# Patient Record
Sex: FEMALE | Race: WHITE | Employment: OTHER | URBAN - NONMETROPOLITAN AREA
[De-identification: names, ages, dates, MRNs, and addresses within clinical notes are randomized per-mention and may not be internally consistent; named-entity substitution may affect disease eponyms.]

---

## 2018-09-25 ENCOUNTER — HOSPITAL ENCOUNTER (OUTPATIENT)
Age: 68
Discharge: HOME | End: 2018-09-25
Payer: MEDICARE

## 2018-09-25 ENCOUNTER — HOSPITAL ENCOUNTER (OUTPATIENT)
Dept: GENERAL RADIOLOGY | Age: 68
Discharge: HOME | End: 2018-09-25
Payer: MEDICARE

## 2018-09-25 DIAGNOSIS — M25.551 RIGHT HIP PAIN: ICD-10-CM

## 2018-09-25 DIAGNOSIS — M54.6 LOWER THORACIC BACK PAIN: ICD-10-CM

## 2018-09-25 PROCEDURE — 73502 X-RAY EXAM HIP UNI 2-3 VIEWS: CPT

## 2018-09-25 PROCEDURE — 72100 X-RAY EXAM L-S SPINE 2/3 VWS: CPT

## 2020-03-10 ENCOUNTER — OFFICE VISIT (OUTPATIENT)
Dept: INTERNAL MEDICINE CLINIC | Age: 70
End: 2020-03-10
Payer: MEDICARE

## 2020-03-10 VITALS
HEIGHT: 66 IN | SYSTOLIC BLOOD PRESSURE: 130 MMHG | DIASTOLIC BLOOD PRESSURE: 72 MMHG | WEIGHT: 115.4 LBS | BODY MASS INDEX: 18.54 KG/M2 | HEART RATE: 66 BPM

## 2020-03-10 PROCEDURE — 93000 ELECTROCARDIOGRAM COMPLETE: CPT | Performed by: INTERNAL MEDICINE

## 2020-03-10 PROCEDURE — 99204 OFFICE O/P NEW MOD 45 MIN: CPT | Performed by: INTERNAL MEDICINE

## 2020-03-10 PROCEDURE — G8510 SCR DEP NEG, NO PLAN REQD: HCPCS | Performed by: INTERNAL MEDICINE

## 2020-03-10 PROCEDURE — 3288F FALL RISK ASSESSMENT DOCD: CPT | Performed by: INTERNAL MEDICINE

## 2020-03-10 RX ORDER — LANOLIN ALCOHOL/MO/W.PET/CERES
400 CREAM (GRAM) TOPICAL DAILY
COMMUNITY
End: 2021-02-18

## 2020-03-10 RX ORDER — LISINOPRIL 5 MG/1
5 TABLET ORAL DAILY
Qty: 90 TABLET | Refills: 1 | Status: SHIPPED | OUTPATIENT
Start: 2020-03-10 | End: 2020-10-25

## 2020-03-10 RX ORDER — LORATADINE 10 MG/1
10 TABLET ORAL DAILY
COMMUNITY
End: 2020-04-27

## 2020-03-10 RX ORDER — ESTRADIOL 2 MG/1
2 RING VAGINAL ONCE
COMMUNITY

## 2020-03-10 RX ORDER — DENOSUMAB 60 MG/ML
60 INJECTION SUBCUTANEOUS ONCE
COMMUNITY

## 2020-03-10 RX ORDER — LISINOPRIL 5 MG/1
5 TABLET ORAL DAILY
COMMUNITY
End: 2020-03-10 | Stop reason: SDUPTHER

## 2020-03-10 RX ORDER — VITAMIN B COMPLEX
1 CAPSULE ORAL DAILY
COMMUNITY
End: 2022-06-23

## 2020-03-10 ASSESSMENT — PATIENT HEALTH QUESTIONNAIRE - PHQ9
SUM OF ALL RESPONSES TO PHQ QUESTIONS 1-9: 0
SUM OF ALL RESPONSES TO PHQ QUESTIONS 1-9: 0
SUM OF ALL RESPONSES TO PHQ9 QUESTIONS 1 & 2: 0
1. LITTLE INTEREST OR PLEASURE IN DOING THINGS: 0
2. FEELING DOWN, DEPRESSED OR HOPELESS: 0

## 2020-03-10 NOTE — PROGRESS NOTES
1950    Chief Complaint   Patient presents with   [facility] Doctor     establish care - due for annual physical        Pt is a 71 y.o. female who presents for initial visit to establish care. SI joint pain intermittently - recent flare calmed with Aleve. If needed send back to [facility] Dr. Yun Neil for SI joint injection. She typically has gotten on Q 18 months and done well. About due now but doing well now. Hypertension - BP controlled on lisinopril. Follow BMP. No issue with cough, follow potassium and kidney function. Allergic rhinitis - try Xyzal instead of Claritin. Then try Flonase with polysporin. She has had nose bleeds in past with nasal spray but in winter when dryness is any issue. Osteoporosis - treated by GYN - on Prolia. Past Medical History:   Diagnosis Date    Allergic rhinitis     Claritin, Flonase prn    Colon polyp     Dr. Cornelia Lema - colonoscopy done 5/2015, due in 5-7 years (hyperplastic polyps)    Hemorrhoids     s/p banding    Hypertension     Irritable bowel syndrome     Osteoporosis     on Prolia with GYN - Almarie Credit.  SI (sacroiliac) joint inflammation (HCC)     s/p SI joint injections - Dr. Flex Joy at [facility] due to insurance - 10/2018       Past Surgical History:   Procedure Laterality Date    OVARY REMOVAL  2005    TONSILLECTOMY AND ADENOIDECTOMY         Current Outpatient Medications   Medication Sig Dispense Refill    b complex vitamins capsule Take 1 capsule by mouth daily      Calcium Carb-Cholecalciferol (CALCIUM 600 + D PO) Take 1 tablet by mouth daily      loratadine (CLARITIN) 10 MG tablet Take 10 mg by mouth daily      estradiol (ESTRING) 2 MG vaginal ring Place 2 mg vaginally once Follow package directions.       folic acid (FOLVITE) 633 MCG tablet Take 400 mcg by mouth daily      VITAMIN D PO Take 1,000 Units by mouth daily      denosumab (PROLIA) 60 MG/ML SOSY SC injection Inject 60 mg into the skin once      lisinopril

## 2020-03-26 ENCOUNTER — TELEPHONE (OUTPATIENT)
Dept: INTERNAL MEDICINE CLINIC | Age: 70
End: 2020-03-26

## 2020-04-20 ENCOUNTER — VIRTUAL VISIT (OUTPATIENT)
Dept: INTERNAL MEDICINE CLINIC | Age: 70
End: 2020-04-20
Payer: MEDICARE

## 2020-04-20 VITALS
BODY MASS INDEX: 18.57 KG/M2 | WEIGHT: 115 LBS | SYSTOLIC BLOOD PRESSURE: 112 MMHG | DIASTOLIC BLOOD PRESSURE: 63 MMHG | HEART RATE: 74 BPM

## 2020-04-20 PROCEDURE — G0439 PPPS, SUBSEQ VISIT: HCPCS | Performed by: INTERNAL MEDICINE

## 2020-04-20 ASSESSMENT — LIFESTYLE VARIABLES
HAS A RELATIVE, FRIEND, DOCTOR, OR ANOTHER HEALTH PROFESSIONAL EXPRESSED CONCERN ABOUT YOUR DRINKING OR SUGGESTED YOU CUT DOWN: 0
HOW OFTEN DO YOU HAVE SIX OR MORE DRINKS ON ONE OCCASION: 0
AUDIT-C TOTAL SCORE: 4
HOW OFTEN DURING THE LAST YEAR HAVE YOU BEEN UNABLE TO REMEMBER WHAT HAPPENED THE NIGHT BEFORE BECAUSE YOU HAD BEEN DRINKING: 0
HAVE YOU OR SOMEONE ELSE BEEN INJURED AS A RESULT OF YOUR DRINKING: 0
HOW OFTEN DURING THE LAST YEAR HAVE YOU FOUND THAT YOU WERE NOT ABLE TO STOP DRINKING ONCE YOU HAD STARTED: 0
HOW OFTEN DO YOU HAVE A DRINK CONTAINING ALCOHOL: 4
HOW OFTEN DURING THE LAST YEAR HAVE YOU NEEDED AN ALCOHOLIC DRINK FIRST THING IN THE MORNING TO GET YOURSELF GOING AFTER A NIGHT OF HEAVY DRINKING: 0
HOW OFTEN DURING THE LAST YEAR HAVE YOU HAD A FEELING OF GUILT OR REMORSE AFTER DRINKING: 0
HOW MANY STANDARD DRINKS CONTAINING ALCOHOL DO YOU HAVE ON A TYPICAL DAY: 0
AUDIT TOTAL SCORE: 4
HOW OFTEN DURING THE LAST YEAR HAVE YOU FAILED TO DO WHAT WAS NORMALLY EXPECTED FROM YOU BECAUSE OF DRINKING: 0

## 2020-04-20 ASSESSMENT — PATIENT HEALTH QUESTIONNAIRE - PHQ9
SUM OF ALL RESPONSES TO PHQ QUESTIONS 1-9: 0
SUM OF ALL RESPONSES TO PHQ QUESTIONS 1-9: 0

## 2020-04-20 NOTE — PROGRESS NOTES
but not able to give right now in setting of COVID. Annual wellness visit to be set up yearly. Lucius Gruber is a 71 y.o. female being evaluated by a Virtual Visit (video visit) encounter to address concerns as mentioned above. A caregiver was present when appropriate. Due to this being a TeleHealth encounter (During FNSIS-88 public health emergency), evaluation of the following organ systems was limited: Vitals/Constitutional/EENT/Resp/CV/GI//MS/Neuro/Skin/Heme-Lymph-Imm. Pursuant to the emergency declaration under the [address] [place] [address], [address] [place] [address] authority and the [employer] and [employer] Act, this Virtual Visit was conducted with patient's (and/or legal guardian's) consent, to reduce the patient's risk of exposure to COVID-19 and provide necessary medical care. The patient (and/or legal guardian) has also been advised to contact this office for worsening conditions or problems, and seek emergency medical treatment and/or call 911 if deemed necessary. Patient identification was verified at the start of the visit: Yes    Total time spent for this encounter: Not billed by time    Services were provided through a video synchronous discussion virtually to substitute for in-person clinic visit. Patient and provider were located at their individual homes. --Riley Lackey MD on 4/27/2020 at 11:26 PM    An electronic signature was used to authenticate this note.

## 2020-04-20 NOTE — PATIENT INSTRUCTIONS
Personalized Preventive Plan for Cinthia Gallegos - 4/20/2020  Medicare offers a range of preventive health benefits. Some of the tests and screenings are paid in full while other may be subject to a deductible, co-insurance, and/or copay. Some of these benefits include a comprehensive review of your medical history including lifestyle, illnesses that may run in your family, and various assessments and screenings as appropriate. After reviewing your medical record and screening and assessments performed today your provider may have ordered immunizations, labs, imaging, and/or referrals for you. A list of these orders (if applicable) as well as your Preventive Care list are included within your After Visit Summary for your review. Other Preventive Recommendations:    · A preventive eye exam performed by an eye specialist is recommended every 1-2 years to screen for glaucoma; cataracts, macular degeneration, and other eye disorders. · A preventive dental visit is recommended every 6 months. · Try to get at least 150 minutes of exercise per week or 10,000 steps per day on a pedometer . · Order or download the FREE \"Exercise & Physical Activity: Your Everyday Guide\" from [employer]. Call 1-179.393.3862 or search [employer] online. · You need 5868-8206 mg of calcium and 5704-4818 IU of vitamin D per day. It is possible to meet your calcium requirement with diet alone, but a vitamin D supplement is usually necessary to meet this goal.  · When exposed to the sun, use a sunscreen that protects against both UVA and UVB radiation with an SPF of 30 or greater. Reapply every 2 to 3 hours or after sweating, drying off with a towel, or swimming. · Always wear a seat belt when traveling in a car. Always wear a helmet when riding a bicycle or motorcycle.

## 2020-04-21 ENCOUNTER — TELEPHONE (OUTPATIENT)
Dept: ADMINISTRATIVE | Age: 70
End: 2020-04-21

## 2020-04-27 RX ORDER — LEVOCETIRIZINE DIHYDROCHLORIDE 5 MG/1
5 TABLET, FILM COATED ORAL NIGHTLY
COMMUNITY
End: 2021-02-18

## 2020-05-22 ENCOUNTER — NURSE ONLY (OUTPATIENT)
Dept: LAB | Age: 70
End: 2020-05-22

## 2020-05-22 LAB
ANION GAP SERPL CALCULATED.3IONS-SCNC: 11 MEQ/L (ref 8–16)
BASOPHILS # BLD: 0.8 %
BASOPHILS ABSOLUTE: 0 THOU/MM3 (ref 0–0.1)
BUN BLDV-MCNC: 12 MG/DL (ref 7–22)
CALCIUM SERPL-MCNC: 9.3 MG/DL (ref 8.5–10.5)
CHLORIDE BLD-SCNC: 100 MEQ/L (ref 98–111)
CHOLESTEROL, TOTAL: 232 MG/DL (ref 100–199)
CO2: 25 MEQ/L (ref 23–33)
CREAT SERPL-MCNC: 0.8 MG/DL (ref 0.4–1.2)
EOSINOPHIL # BLD: 3 %
EOSINOPHILS ABSOLUTE: 0.1 THOU/MM3 (ref 0–0.4)
ERYTHROCYTE [DISTWIDTH] IN BLOOD BY AUTOMATED COUNT: 13.2 % (ref 11.5–14.5)
ERYTHROCYTE [DISTWIDTH] IN BLOOD BY AUTOMATED COUNT: 50.7 FL (ref 35–45)
GFR SERPL CREATININE-BSD FRML MDRD: 71 ML/MIN/1.73M2
GLUCOSE BLD-MCNC: 96 MG/DL (ref 70–108)
HCT VFR BLD CALC: 37.8 % (ref 37–47)
HDLC SERPL-MCNC: 114 MG/DL
HEMOGLOBIN: 12.2 GM/DL (ref 12–16)
HEPATITIS C ANTIBODY: NEGATIVE
IMMATURE GRANS (ABS): 0.02 THOU/MM3 (ref 0–0.07)
IMMATURE GRANULOCYTES: 0.4 %
LDL CHOLESTEROL CALCULATED: 103 MG/DL
LYMPHOCYTES # BLD: 32.3 %
LYMPHOCYTES ABSOLUTE: 1.6 THOU/MM3 (ref 1–4.8)
MCH RBC QN AUTO: 33.2 PG (ref 26–33)
MCHC RBC AUTO-ENTMCNC: 32.3 GM/DL (ref 32.2–35.5)
MCV RBC AUTO: 102.7 FL (ref 81–99)
MONOCYTES # BLD: 11.2 %
MONOCYTES ABSOLUTE: 0.5 THOU/MM3 (ref 0.4–1.3)
NUCLEATED RED BLOOD CELLS: 0 /100 WBC
PLATELET # BLD: 216 THOU/MM3 (ref 130–400)
PMV BLD AUTO: 9.6 FL (ref 9.4–12.4)
POTASSIUM SERPL-SCNC: 4.1 MEQ/L (ref 3.5–5.2)
RBC # BLD: 3.68 MILL/MM3 (ref 4.2–5.4)
SEG NEUTROPHILS: 52.3 %
SEGMENTED NEUTROPHILS ABSOLUTE COUNT: 2.6 THOU/MM3 (ref 1.8–7.7)
SODIUM BLD-SCNC: 136 MEQ/L (ref 135–145)
TRIGL SERPL-MCNC: 76 MG/DL (ref 0–199)
VITAMIN D 25-HYDROXY: 60 NG/ML (ref 30–100)
WBC # BLD: 4.9 THOU/MM3 (ref 4.8–10.8)

## 2020-06-16 PROBLEM — M81.0 SENILE OSTEOPOROSIS: Status: ACTIVE | Noted: 2020-06-16

## 2020-06-16 RX ORDER — SODIUM CHLORIDE 9 MG/ML
INJECTION, SOLUTION INTRAVENOUS CONTINUOUS
Status: CANCELLED | OUTPATIENT
Start: 2020-06-19

## 2020-06-16 RX ORDER — METHYLPREDNISOLONE SODIUM SUCCINATE 125 MG/2ML
125 INJECTION, POWDER, LYOPHILIZED, FOR SOLUTION INTRAMUSCULAR; INTRAVENOUS ONCE
Status: CANCELLED | OUTPATIENT
Start: 2020-06-19

## 2020-06-16 RX ORDER — EPINEPHRINE 1 MG/ML
0.3 INJECTION, SOLUTION, CONCENTRATE INTRAVENOUS PRN
Status: CANCELLED | OUTPATIENT
Start: 2020-06-19

## 2020-06-16 RX ORDER — DIPHENHYDRAMINE HYDROCHLORIDE 50 MG/ML
50 INJECTION INTRAMUSCULAR; INTRAVENOUS ONCE
Status: CANCELLED | OUTPATIENT
Start: 2020-06-19

## 2020-07-07 ENCOUNTER — HOSPITAL ENCOUNTER (OUTPATIENT)
Dept: NURSING | Age: 70
Discharge: HOME | End: 2020-07-07
Payer: MEDICARE

## 2020-07-07 VITALS
HEART RATE: 70 BPM | SYSTOLIC BLOOD PRESSURE: 128 MMHG | OXYGEN SATURATION: 99 % | RESPIRATION RATE: 18 BRPM | DIASTOLIC BLOOD PRESSURE: 66 MMHG

## 2020-07-07 DIAGNOSIS — M81.0 SENILE OSTEOPOROSIS: Primary | ICD-10-CM

## 2020-07-07 PROCEDURE — 6360000002 HC RX W HCPCS: Performed by: NURSE PRACTITIONER

## 2020-07-07 PROCEDURE — 96372 THER/PROPH/DIAG INJ SC/IM: CPT

## 2020-07-07 RX ORDER — SODIUM CHLORIDE 9 MG/ML
INJECTION, SOLUTION INTRAVENOUS CONTINUOUS
Status: CANCELLED | OUTPATIENT
Start: 2021-01-05

## 2020-07-07 RX ORDER — METHYLPREDNISOLONE SODIUM SUCCINATE 125 MG/2ML
125 INJECTION, POWDER, LYOPHILIZED, FOR SOLUTION INTRAMUSCULAR; INTRAVENOUS ONCE
Status: CANCELLED | OUTPATIENT
Start: 2021-01-05

## 2020-07-07 RX ORDER — DIPHENHYDRAMINE HYDROCHLORIDE 50 MG/ML
50 INJECTION INTRAMUSCULAR; INTRAVENOUS ONCE
Status: CANCELLED | OUTPATIENT
Start: 2021-01-05

## 2020-07-07 RX ADMIN — DENOSUMAB 60 MG: 60 INJECTION SUBCUTANEOUS at 11:05

## 2020-07-07 NOTE — PROGRESS NOTES
1100 pt arrives ambulatory for prolia injection. Injection explained and questions answered. PT RIGHTS AND RESPONSIBILITIES OFFERED TO PT.  1105 injection given. Pt tolerated it well with no complaints. 1110 pt discharged ambulatory with instructions with no complaints.            _m___ Safety:       (Environmental)   [place] to environment   Ensure ID band is correct and in place/ allergy band as needed   Assess for fall risk   Initiate fall precautions as applicable (fall band, side rails, etc.)   Call light within reach   Bed in low position/ wheels locked    m____ Pain:        Assess pain level and characteristics   Administer analgesics as ordered   Assess effectiveness of pain management and report to MD as needed    _m___ Knowledge Deficit:   Assess baseline knowledge   Provide teaching at level of understanding   Provide teaching via preferred learning method   Evaluate teaching effectiveness    __m__ Hemodynamic/Respiratory Status:       (Pre and Post Procedure Monitoring)   Assess/Monitor vital signs and LOC   Assess Baseline SpO2 prior to any sedation   Obtain weight/height   Assess vital signs/ LOC until patient meets discharge criteria   Monitor procedure site and notify MD of any issues

## 2021-01-08 ENCOUNTER — HOSPITAL ENCOUNTER (OUTPATIENT)
Dept: NURSING | Age: 71
Discharge: HOME | End: 2021-01-08
Payer: MEDICARE

## 2021-01-08 VITALS
HEIGHT: 66 IN | DIASTOLIC BLOOD PRESSURE: 79 MMHG | BODY MASS INDEX: 18.26 KG/M2 | TEMPERATURE: 98.1 F | RESPIRATION RATE: 18 BRPM | HEART RATE: 72 BPM | OXYGEN SATURATION: 99 % | SYSTOLIC BLOOD PRESSURE: 146 MMHG | WEIGHT: 113.6 LBS

## 2021-01-08 DIAGNOSIS — M81.0 SENILE OSTEOPOROSIS: Primary | ICD-10-CM

## 2021-01-08 PROCEDURE — 96372 THER/PROPH/DIAG INJ SC/IM: CPT

## 2021-01-08 PROCEDURE — 6360000002 HC RX W HCPCS: Performed by: NURSE PRACTITIONER

## 2021-01-08 RX ORDER — SODIUM CHLORIDE 9 MG/ML
INJECTION, SOLUTION INTRAVENOUS CONTINUOUS
Status: CANCELLED | OUTPATIENT
Start: 2021-07-09

## 2021-01-08 RX ORDER — DIPHENHYDRAMINE HYDROCHLORIDE 50 MG/ML
50 INJECTION INTRAMUSCULAR; INTRAVENOUS ONCE
Status: CANCELLED | OUTPATIENT
Start: 2021-07-09 | End: 2021-07-09

## 2021-01-08 RX ORDER — METHYLPREDNISOLONE SODIUM SUCCINATE 125 MG/2ML
125 INJECTION, POWDER, LYOPHILIZED, FOR SOLUTION INTRAMUSCULAR; INTRAVENOUS ONCE
Status: CANCELLED | OUTPATIENT
Start: 2021-07-09 | End: 2021-07-09

## 2021-01-08 RX ADMIN — DENOSUMAB 60 MG: 60 INJECTION SUBCUTANEOUS at 11:29

## 2021-01-08 ASSESSMENT — PAIN - FUNCTIONAL ASSESSMENT: PAIN_FUNCTIONAL_ASSESSMENT: 0-10

## 2021-01-08 NOTE — PROGRESS NOTES
__m__ Safety:       (Environmental)  ? [place] to environment  ? Ensure ID band is correct and in place/ allergy band as needed  ? Assess for fall risk  ? Initiate fall precautions as applicable (fall band, side rails, etc.)  ? Call light within reach  ? Bed in low position/ wheels locked    _m___ Pain:       ? Assess pain level and characteristics  ? Administer analgesics as ordered  ? Assess effectiveness of pain management and report to MD as needed    _m___ Knowledge Deficit:  ? Assess baseline knowledge  ? Provide teaching at level of understanding  ? Provide teaching via preferred learning method  ? Evaluate teaching effectiveness    __m__ Hemodynamic/Respiratory Status:       (Pre and Post Procedure Monitoring)  ? Assess/Monitor vital signs and LOC  ? Assess Baseline SpO2 prior to any sedation  ? Obtain weight/height  ? Assess vital signs/ LOC until patient meets discharge criteria  ?  Monitor procedure site and notify MD of any issues

## 2021-02-18 ENCOUNTER — OFFICE VISIT (OUTPATIENT)
Dept: INTERNAL MEDICINE CLINIC | Age: 71
End: 2021-02-18
Payer: MEDICARE

## 2021-02-18 VITALS
WEIGHT: 111.8 LBS | DIASTOLIC BLOOD PRESSURE: 68 MMHG | HEART RATE: 72 BPM | BODY MASS INDEX: 17.97 KG/M2 | TEMPERATURE: 97.3 F | HEIGHT: 66 IN | SYSTOLIC BLOOD PRESSURE: 124 MMHG

## 2021-02-18 DIAGNOSIS — I10 ESSENTIAL HYPERTENSION: ICD-10-CM

## 2021-02-18 DIAGNOSIS — M54.41 ACUTE RIGHT-SIDED LOW BACK PAIN WITH RIGHT-SIDED SCIATICA: Primary | ICD-10-CM

## 2021-02-18 DIAGNOSIS — J30.9 ALLERGIC RHINITIS, UNSPECIFIED SEASONALITY, UNSPECIFIED TRIGGER: ICD-10-CM

## 2021-02-18 PROCEDURE — 99214 OFFICE O/P EST MOD 30 MIN: CPT | Performed by: INTERNAL MEDICINE

## 2021-02-18 RX ORDER — ACYCLOVIR 50 MG/G
OINTMENT TOPICAL
COMMUNITY
End: 2021-10-28

## 2021-02-18 RX ORDER — FEXOFENADINE HCL 180 MG/1
180 TABLET ORAL DAILY
COMMUNITY

## 2021-02-18 RX ORDER — DIAPER,BRIEF,INFANT-TODD,DISP
EACH MISCELLANEOUS
Qty: 1 TUBE | Refills: 1 | Status: CANCELLED | OUTPATIENT
Start: 2021-02-18 | End: 2021-02-25

## 2021-02-18 ASSESSMENT — PATIENT HEALTH QUESTIONNAIRE - PHQ9
2. FEELING DOWN, DEPRESSED OR HOPELESS: 0
SUM OF ALL RESPONSES TO PHQ QUESTIONS 1-9: 0
SUM OF ALL RESPONSES TO PHQ9 QUESTIONS 1 & 2: 0
SUM OF ALL RESPONSES TO PHQ QUESTIONS 1-9: 0
1. LITTLE INTEREST OR PLEASURE IN DOING THINGS: 0

## 2021-02-18 NOTE — PROGRESS NOTES
1950    Chief Complaint   Patient presents with    Back Pain     sciatic down right leg    Hypertension    Allergic Rhinitis        Pt is a 79 y.o. female who presents for acute visit for back and right leg pain, and follow up of HTN and allergic rhinitis. SI joint pain intermittently - she had x-ray right hip 2016 with mild degenerative changes of right SI joint and given SI join steroid injection at [facility] radiology. She had issues again 9/2018 and right hip x-ray with mild joint space narrowing and sclerosis at right SI joint/lumbar spine x-ray noted mild facet disease L3-S1 and mild levoconvex curvature of lumbar spine. Again was sent back to [facility], Dr. Charline Holden, for SI joint injection. This January 2021, she started to have right hip - now pain down leg and foot tingles - but intermittent. It is better if standing, worse with sitting and lying down. If takes enough Aleve it will go away but comes back again. She does do yoga. Will set up PT and Aleve x 6 weeks - see back in 8 weeks and will see if need to progress to MRI. Sleeps on left side only as unable to sleep on right side due to pain. Hypertension - BP controlled on lisinopril. Follow BMP - 6/2020 normal.  No issue with cough, follow potassium and kidney function. Allergic rhinitis - at previous visit suggested she try Xyzal instead of Claritin. Then try Flonase with polysporin. She has had nose bleeds in past with nasal spray but in winter when dryness is any issue. Xyzal affected sleep. Suggested she stay on Allegra as tolerating this well. Osteoporosis - treated by GYN - Daya Rojo CNP - on Prolia. She wants to wait till June to get labs for Prolia and HTN.       Past Medical History:   Diagnosis Date    Allergic rhinitis     Claritin, Flonase prn    Colon polyp     Dr. Manuel Rosenberg - colonoscopy done 5/2015, due in 5-7 years (hyperplastic polyps)    Hemorrhoids     s/p banding    Hypertension     Irritable bowel syndrome  Osteoporosis     on Prolia with GYN - Negar Otero.  SI (sacroiliac) joint inflammation (HCC)     s/p SI joint injections - Dr. Samuel Haile at [facility] due to insurance - 10/2018       Current Outpatient Medications   Medication Sig Dispense Refill    fexofenadine (ALLEGRA ALLERGY) 180 MG tablet Take 180 mg by mouth daily      acyclovir (ZOVIRAX) 5 % ointment Apply topically every 3 hours Apply topically every 3 hours.  lisinopril (PRINIVIL;ZESTRIL) 5 MG tablet TAKE 1 TABLET DAILY 90 tablet 1    Triamcinolone Acetonide (NASACORT ALLERGY 24HR NA) by Nasal route      NONFORMULARY Compounded hormones 6x a week.  b complex vitamins capsule Take 1 capsule by mouth daily      Calcium Carb-Cholecalciferol (CALCIUM 600 + D PO) Take 1 tablet by mouth daily      estradiol (ESTRING) 2 MG vaginal ring Place 2 mg vaginally once Follow package directions.  VITAMIN D PO Take 1,000 Units by mouth daily      denosumab (PROLIA) 60 MG/ML SOSY SC injection Inject 60 mg into the skin once      miconazole nitrate 2 % OINT Apply topically 2 times daily 1 Tube 1     No current facility-administered medications for this visit. Allergies   Allergen Reactions    Pcn [Penicillins] Other (See Comments)    Chocolate Nausea And Vomiting    Peppermint Oil Nausea And Vomiting     Review of Systems - General ROS: negative for - chills or fever  Psychological ROS: negative for - anxiety and depression  Hematological and Lymphatic ROS: No history of blood clots or bleeding disorder.    Respiratory ROS: no cough, shortness of breath, or wheezing  Cardiovascular ROS: no chest pain or dyspnea on exertion, tolerates a flight of stairs, vacuuming  Gastrointestinal ROS: no abdominal pain, change in bowel habits, or black or bloody stools  Genito-Urinary ROS: no dysuria, trouble voiding, or hematuria  Musculoskeletal ROS: negative for - muscle pain or muscular weakness, positive right buttock/leg pain  Neurological ROS: negative for - headaches, numbness/tingling, seizures or weakness  Dermatological ROS: negative for - rash or skin lesion changes - rash with second COVID vaccine. Blood pressure 124/68, pulse 72, temperature 97.3 °F (36.3 °C), height 5' 5.98\" (1.676 m), weight 111 lb 12.8 oz (50.7 kg). Physical Examination: General appearance -alert, well appearing, and in no distress  Mental status - alert, oriented to person, place, and time  Head - atraumatic, normocephalic  Neck - supple, no significant adenopathy  Chest - clear to auscultation, no wheezes, rales or rhonchi, symmetric air entry  Heart - normal rate, regular rhythm, normal S1, S2, no murmurs, rubs, clicks or gallops  Abdomen -soft, nontender, nondistended  Neurological - alert, oriented, motor and sensation are grossly intact bilateral lower extremities. Back - no pain with palpation of spine, slight right paraspinal muscles tenderness. Pain deep in right buttock into right leg. Extremities - peripheral pulses normal, no pedal edema, no clubbing or cyanosis  Skin - warm and dry    Diagnostic data:   I have reviewed recent diagnostic testing including labs 6/2020, previous hip and lumbar spine x-rays with patient. Assessment and Plan:       Diagnosis Orders   1. Acute right-sided low back pain with right-sided sciatica  [facility] -  [facility]   2. Essential hypertension     3. Allergic rhinitis, unspecified seasonality, unspecified trigger       Right sided back pain with right buttock/leg pain - as it improves with Aleve - will try a longer course with PT to strengthen core and stretch out leg muscles. Pain could be related to facet disease and not SI joint as having intermittent numbness. May consider MRI lumbar spine if continues with pain after Aleve x 6 weeks and PT. Hypertension - BP controlled, continue lisinopril - order BMP for 6/2021. Allergic rhinitis - stable, continue Allegra.     Allergies: Pcn [penicillins], Chocolate, and Peppermint oil     Follow up in 2 months for wellness visit but could switch to regular visit is still having back pain. Shavon Roberson MD    [address] [place] [address] INTERNAL MEDICINE  [address]  Dept: [phone]  Dept Fax: [phone]: [phone]

## 2021-02-24 ENCOUNTER — TELEPHONE (OUTPATIENT)
Dept: INTERNAL MEDICINE CLINIC | Age: 71
End: 2021-02-24

## 2021-02-24 NOTE — TELEPHONE ENCOUNTER
Patient called stating that she thought that you were going to send a ointment to the pharmacy for her . Patient thought it was an antifungal . Please advise . I did not see anything in the note .  ( [facility] )

## 2021-02-25 ENCOUNTER — HOSPITAL ENCOUNTER (OUTPATIENT)
Dept: PHYSICAL THERAPY | Age: 71
Setting detail: THERAPIES SERIES
Discharge: HOME | End: 2021-02-25
Payer: MEDICARE

## 2021-02-25 DIAGNOSIS — K13.0 ANGULAR CHEILITIS: Primary | ICD-10-CM

## 2021-02-25 PROCEDURE — 97161 PT EVAL LOW COMPLEX 20 MIN: CPT

## 2021-02-25 PROCEDURE — 97140 MANUAL THERAPY 1/> REGIONS: CPT

## 2021-02-25 NOTE — PROGRESS NOTES
** PLEASE SIGN, DATE AND TIME CERTIFICATION BELOW AND RETURN TO [facility] OUTPATIENT [facility]HABILITATION (FAX #: [phone]). ATTEST/CO-SIGN IF ACCESSING VIA [employer]. THANK YOU.**    I certify that I have examined the patient below and determined that Physical Medicine and Rehabilitation service is necessary and that I approve the established plan of care for up to 90 days or as specifically noted. Attestation, signature or co-signature of physician indicates approval of certification requirements.    ________________________ ____________ __________  Physician Signature   Date   Time  [address] [facility]  PHYSICAL THERAPY  [x] EVALUATION  [] DAILY NOTE (LAND) [] DAILY NOTE (AQUATIC ) [] PROGRESS NOTE [] DISCHARGE NOTE    [x] [address] [facility]   [] [place] [address]    [] [address] [facility]   [] [facility]    Date: 2021  Patient Name:  Vic Thibodeaux  : 1950  MRN: [id number]  CSN: [id number]    Referring Practitioner Deja Gracia MD   Diagnosis Lumbago with sciatica, right side [M54.41]    Treatment Diagnosis Acute on chronic right lumbar pain with sciatica, core and hip weakness, leg length discrepancy   Date of Evaluation 21    Additional Pertinent History HTN, osteoporosis. Functional Outcome Measure Used Oswestry   Functional Outcome Score 9/50= 18% (21)       Insurance: Primary: Payor: Génesis Rodriguez /  /  / ,   Secondary:    Authorization Information: Aquatics and modalities covered except ionto, NO telehealth   Visit # 1, 1/10 for progress note   Visits Allowed: Unlimited    Recertification Date:    Physician Follow-Up: 21   Physician Orders:    History of Present Illness: 4 years ago, pt had XR which showed degeneration of SIJ, so had injections which helped. Pain returned a year ago, took Aleve and went away in 10 days. Now in January to developed pain in right hip with tingling in foot.  Pt does yoga which helps, denies doing any prior PT for this issue. SUBJECTIVE: Pain with sitting and laying down. Right leg has sensation of giving out when walking, with soreness when rubbing anterior thigh. Dr. Meryle Po wants pt to try PT, if unsuccessful will order an MRI. Pt unable to lay on right side and difficulty sleeping. Tingling in right legs comes and goes. Pt notes if she takes enough Aleve she can manage pain. Pt denies use of heat or ice. Social/Functional History and Current Status:  Medications and Allergies have been reviewed and are listed on Medical History Questionnaire. Steve Covarrubias lives alone in a single story home with 2 stairs and no handrail to enter. Task Previous Current   ADLs  Independent Independent   IADL's Independent Independent   Ambulation Independent Independent   Transfers Independent Independent   Recreation Independent- yoga 2-3x/wk Independent   Community Integration Independent Independent   Driving Active  Active    Work Retired  Retired     OBJECTIVE:    Pain: 4/10 right hip/thigh soreness/burning, 7/10 at worst   Palpation Tender right piriformis   Observation Sits with right leg crossed over left, slightly rotated to left; thin and reports being flexible; right leg ~1/4 inch shorter than left   Posture Normal spinal curvature, erect posture, level hips and PSIS       Range of Motion Lumbar:  WNL- no increased pain  Hip WFL- no increased pain   Strength Hip 4+/5 to 5-/5  Knee 4+/5  Ankle df 4/5   Coordination    Sensation Intermittent tingling in right leg down to foot   Bed Mobility Independent    Transfers Independent    Ambulation No AD, equal step length, mild left trunk lean during left stance   Stairs    Balance    Special Tests Negative SLR, HAWK and FADIR   Repetitive motion testing Loaded flexion and extension 10x- no reproduction or increased pain  Unloaded flexion and extension 10x- no reproduction or increased pain     TREATMENT Precautions:    Pain: 4/10 right hip/thigh    X in shaded column indicates activity completed today   Modalities Parameters/  Location  Notes                     Manual Therapy Time/Technique  Notes   Long leg traction right 2x30 sec x    Piriformis release right x5 min x Less thigh pain         Exercise/Intervention   Notes                                                                                  Specific Interventions Next Treatment: continue trigger point release to piriformis, piriformis and hamstring stretching, core stabilization    Activity/Treatment Tolerance:  [x]  Patient tolerated treatment well  []  Patient limited by fatigue  []  Patient limited by pain   []  Patient limited by medical complications  []  Other:     Assessment: 79year old presents with acute on chronic lumbar pain radiating into right hip and thigh and occasionally down the leg. Pt demos leg length discrepancy, so heel lift inserted into right shoe. Pt demos piriformis tightness and core/hip weakness. Pt has good flexibility and ok to continue yoga. Pt will benefit from skilled PT to address listed impairments. Body Structures/Functions/Activity Limitations: impaired ROM, impaired strength, pain and abnormal gait  Prognosis: good    GOALS:  Patient Goal: Decrease right leg pain    Short Term Goals:  Time Frame: 6 weeks  Patient will report reduced pain to 3/10 to tolerate sitting and laying down to sleep. Patient will demonstrate good core engagement during therapy exercises without cues  to carry over to functional activities, lifting, and housework. Patient will improve B hip strength to 5/5 for stabilization when walking, lifting, and cleaning. Patient will deny right piriformis tenderness to decrease radicular pain for sitting and sleeping. Long Term Goals:  Time Frame: 12 weeks  Patient will be independent and compliant with HEP daily to achieve above goals.    Pt will reduce Oswestry score from 18% to 10% to tolerate sitting and sleeping. Patient Education:   [x]  HEP/Education Completed: Plan of Care, Goals, attendance policy, piriformis massage with tennis ball or backnobber, heel lift wear schedule   Medbridge Access Code:  []  No new Education completed  []  Reviewed Prior HEP      [x]  Patient verbalized and/or demonstrated understanding of education provided. []  Patient unable to verbalize and/or demonstrate understanding of education provided. Will continue education. []  Barriers to learning:     PLAN:  Treatment Recommendations: Strengthening, Range of Motion, Manual Therapy - Soft Tissue Mobilization, Home Exercise Program, Patient Education, Aquatics and Modalities    [x]  Plan of care initiated. Plan to see patient 2 times per week for 12 weeks to address the treatment planned outlined above.   []  Continue with current plan of care  []  Modify plan of care as follows:    []  Hold pending physician visit  []  Discharge    Time In 1440   Time Out 1520   Timed Code Minutes: 10 min   Total Treatment Time: 40 min       Electronically Signed by: Bela Barajas

## 2021-03-03 ENCOUNTER — HOSPITAL ENCOUNTER (OUTPATIENT)
Dept: PHYSICAL THERAPY | Age: 71
Setting detail: THERAPIES SERIES
Discharge: HOME | End: 2021-03-03
Payer: MEDICARE

## 2021-03-03 PROCEDURE — 97110 THERAPEUTIC EXERCISES: CPT

## 2021-03-05 ENCOUNTER — HOSPITAL ENCOUNTER (OUTPATIENT)
Dept: PHYSICAL THERAPY | Age: 71
Setting detail: THERAPIES SERIES
Discharge: HOME | End: 2021-03-05
Payer: MEDICARE

## 2021-03-05 PROCEDURE — 97110 THERAPEUTIC EXERCISES: CPT

## 2021-03-05 PROCEDURE — 97140 MANUAL THERAPY 1/> REGIONS: CPT

## 2021-03-05 NOTE — PROGRESS NOTES
Exercise/Intervention   Notes   Nustep warm up no arms  4 minutes   X Level 4   Supine hamstring stretch with strap 2x30\"  X    Supine piriformis figure 4 stretch 2x30\"  X    Supine piriformis stretch pushing into ER 2 x 30\"      Ab brace  10x3\" 10 minutes X continued instruction on self hand placements, isolation of TrA and breathing patterns     Ab brace with ball squeeze and ab brace with hip abduction 10x3\" orange X    Ab brace with alternating UE, march, alternating UE and LE, SLR, unilateral knee fall out, heel slide 10 x each  X    Ab iso with bridge, bridge with knee extension 10x each  X                           Specific Interventions Next Treatment: continue trigger point release to piriformis, piriformis and hamstring stretching, core stabilization    Activity/Treatment Tolerance:  [x]  Patient tolerated treatment well  []  Patient limited by fatigue  []  Patient limited by pain   []  Patient limited by medical complications  []  Other:     Assessment: Added core strength exercises this date. Patient denies any increased pain with exercises. Patient does report some soreness with manual piriformis relief but does have decreased thigh pain during. Patient would benefit from continued skilled PT to further improve core strength and decrease pain to allow improved functional mobility. GOALS:  Patient Goal: Decrease right leg pain    Short Term Goals:  Time Frame: 6 weeks  Patient will report reduced pain to 3/10 to tolerate sitting and laying down to sleep. Patient will demonstrate good core engagement during therapy exercises without cues  to carry over to functional activities, lifting, and housework. Patient will improve B hip strength to 5/5 for stabilization when walking, lifting, and cleaning. Patient will deny right piriformis tenderness to decrease radicular pain for sitting and sleeping.      Long Term Goals:  Time Frame: 12 weeks  Patient will be independent and compliant with HEP daily

## 2021-03-10 ENCOUNTER — HOSPITAL ENCOUNTER (OUTPATIENT)
Dept: PHYSICAL THERAPY | Age: 71
Setting detail: THERAPIES SERIES
Discharge: HOME | End: 2021-03-10
Payer: MEDICARE

## 2021-03-10 PROCEDURE — 97110 THERAPEUTIC EXERCISES: CPT

## 2021-03-10 NOTE — PROGRESS NOTES
[address] [facility]  PHYSICAL THERAPY  [] EVALUATION  [x] DAILY NOTE (LAND) [] DAILY NOTE (AQUATIC ) [] PROGRESS NOTE [] DISCHARGE NOTE    [x] OUTPATIENT REHABILITATION CENTER [facility]   [] [place] [address]    [] [facility]   [] Gregorio Feldman    Date: 3/10/2021  Patient Name:  Analia Desir  : 1950  MRN: [id number]  CSN: [id number]    Referring Practitioner Norma Ortega MD   Diagnosis Lumbago with sciatica, right side [M54.41]    Treatment Diagnosis Acute on chronic right lumbar pain with sciatica, core and hip weakness, leg length discrepancy   Date of Evaluation 21    Additional Pertinent History HTN, osteoporosis. Functional Outcome Measure Used Oswestry   Functional Outcome Score = 18% (21)       Insurance: Primary: Payor: Tucker Tolbert /  /  / ,   Secondary:    Authorization Information: Aquatics and modalities covered except ionto, NO telehealth   Visit # 4, 4/10 for progress note   Visits Allowed: Unlimited    Recertification Date: 52   Physician Follow-Up: 21   Physician Orders:    History of Present Illness: 4 years ago, pt had XR which showed degeneration of SIJ, so had injections which helped. Pain returned a year ago, took Aleve and went away in 10 days. Now in January to developed pain in right hip with tingling in foot. Pt does yoga which helps, denies doing any prior PT for this issue. SUBJECTIVE: Patient stood for about 4 hours in standing yesterday and this morning had increase in low back pain but describes as a soreness. Patient notes the glut is sore and overall feels she is making slow but steady progress.      TREATMENT   Precautions:    Pain: 3/10 right hip/thigh    X in shaded column indicates activity completed today   Modalities Parameters/  Location  Notes                     Manual Therapy Time/Technique  Notes   Long leg traction right 2x30 sec  Perform again next visit, didn't have time to perform today   Piriformis release right x5 min x Less thigh pain         Exercise/Intervention   Notes   Nustep warm up no arms  3 minutes   X Level 4   Supine hamstring stretch with strap 2x30\"      Supine piriformis figure 4 stretch 2x30\"  X    Supine piriformis stretch pushing into ER 2 x 30\"  X    Ab brace  10x3\" 10 minutes  continued instruction on self hand placements, isolation of TrA and breathing patterns     Ab brace with ball squeeze and ab brace with hip abduction 10x3\" orange  D/C to home program   Ab brace alternating UE and LE extension, SLR, unilateral knee fall out, heel slide 10 x each  X Only performed dead bug and SLR    Ab iso with bridge hands on stomch, bridge with knee extension 10x each 3 seconds X           L sidelying clam and sidelying (strengthen R glut) 10x each 3 seconds X                         hydrostick all directions with ab brace  x10 ew  X             Specific Interventions Next Treatment: continue trigger point release to piriformis, piriformis and hamstring stretching, core stabilization    Activity/Treatment Tolerance:  [x]  Patient tolerated treatment well  []  Patient limited by fatigue  []  Patient limited by pain   []  Patient limited by medical complications  []  Other:     Assessment: Palpable mm spasm along R piriformis noted with patient noting some soreness afterwards. Patient is able to tolerate today's progressions fairly well but does benefit from cues for neutral spine positioning to avoid lumbar extension. GOALS:  Patient Goal: Decrease right leg pain    Short Term Goals:  Time Frame: 6 weeks  Patient will report reduced pain to 3/10 to tolerate sitting and laying down to sleep. Patient will demonstrate good core engagement during therapy exercises without cues  to carry over to functional activities, lifting, and housework. Patient will improve B hip strength to 5/5 for stabilization when walking, lifting, and cleaning.    Patient will deny right

## 2021-03-12 ENCOUNTER — HOSPITAL ENCOUNTER (OUTPATIENT)
Dept: PHYSICAL THERAPY | Age: 71
Setting detail: THERAPIES SERIES
Discharge: HOME | End: 2021-03-12
Payer: MEDICARE

## 2021-03-12 PROCEDURE — 97110 THERAPEUTIC EXERCISES: CPT

## 2021-03-12 NOTE — PROGRESS NOTES
[address] [facility]  PHYSICAL THERAPY  [] EVALUATION  [x] DAILY NOTE (LAND) [] DAILY NOTE (AQUATIC ) [] PROGRESS NOTE [] DISCHARGE NOTE    [x] OUTPATIENT REHABILITATION CENTER [facility]   [] [place] [address]    [] [facility]   [] Janna Fredericknidla    Date: 3/12/2021  Patient Name:  Danay Pepe  : 1950  MRN: [id number]  CSN: [id number]    Referring Practitioner Toy Quiroz MD   Diagnosis Lumbago with sciatica, right side [M54.41]    Treatment Diagnosis Acute on chronic right lumbar pain with sciatica, core and hip weakness, leg length discrepancy   Date of Evaluation 21    Additional Pertinent History HTN, osteoporosis. Functional Outcome Measure Used Oswestry   Functional Outcome Score = 18% (21)       Insurance: Primary: Payor: Miguel Clemons /  /  / ,   Secondary:    Authorization Information: Aquatics and modalities covered except ionto, NO telehealth   Visit # 5, 5/10 for progress note   Visits Allowed: Unlimited    Recertification Date:    Physician Follow-Up: 21   Physician Orders:    History of Present Illness: 4 years ago, pt had XR which showed degeneration of SIJ, so had injections which helped. Pain returned a year ago, took Aleve and went away in 10 days. Now in January to developed pain in right hip with tingling in foot. Pt does yoga which helps, denies doing any prior PT for this issue. SUBJECTIVE: Patient c/o right hip soreness with thigh tenderness when driving. Pt reports yoga doesn't aggravate symptoms. Pain seems to come and go.      TREATMENT   Precautions:    Pain: 3/10 right hip/thigh    X in shaded column indicates activity completed today   Modalities Parameters/  Location  Notes                     Manual Therapy Time/Technique  Notes   Long leg traction right 2x30 sec  Perform again next visit, didn't have time to perform today   Piriformis release right x5 min x Less thigh pain Exercise/Intervention   Notes   Nustep warm up no arms  5 minutes   X Level 4   Supine hamstring stretch with strap 2x30\"      Supine piriformis figure 4 stretch 2x30\"  X B   Supine piriformis stretch pushing into ER 2 x 30\"  X B   Ab brace  10x3\" 10 minutes  continued instruction on self hand placements, isolation of TrA and breathing patterns     Ab brace with ball squeeze and ab brace with hip abduction 10x3\" orange  D/C to home program   Ab brace alternating UE and LE extension, SLR, unilateral knee fall out, heel slide 15 x each  X    Ab iso with bridge hands on stomch, bridge with knee extension 10x each 3 seconds X           Sidelying hip abduction and clam, B 10x each 3 seconds X    Quadruped ab bracing, with alt UEs and alt LEs  10 5 sec x    Seated on yellow stability ball: ab bracing, alt march, alt LAQ 10 5 sec x           hydrostick all directions with ab brace  x10 ew  X             Specific Interventions Next Treatment: continue trigger point release to piriformis, piriformis and hamstring stretching, core stabilization    Activity/Treatment Tolerance:  [x]  Patient tolerated treatment well  []  Patient limited by fatigue  []  Patient limited by pain   []  Patient limited by medical complications  []  Other:     Assessment: Introduced quadruped and seated stability ball exercises with challenge noted but no increased pain. Pt required cues for core engagement during new exercises. Pt notes increased soreness in right hip/thigh during and at end of session. GOALS:  Patient Goal: Decrease right leg pain    Short Term Goals:  Time Frame: 6 weeks  Patient will report reduced pain to 3/10 to tolerate sitting and laying down to sleep. Patient will demonstrate good core engagement during therapy exercises without cues  to carry over to functional activities, lifting, and housework. Patient will improve B hip strength to 5/5 for stabilization when walking, lifting, and cleaning.    Patient will deny right piriformis tenderness to decrease radicular pain for sitting and sleeping. Long Term Goals:  Time Frame: 12 weeks  Patient will be independent and compliant with HEP daily to achieve above goals. Pt will reduce Oswestry score from 18% to 10% to tolerate sitting and sleeping. Patient Education:   []  HEP/Education Completed: clamshell and sidelying hip abduction   Medbridge Access Code:  [x]  No new Education completed  []  Reviewed Prior HEP      []  Patient verbalized and/or demonstrated understanding of education provided. []  Patient unable to verbalize and/or demonstrate understanding of education provided. Will continue education. []  Barriers to learning:     PLAN:  Treatment Recommendations: Strengthening, Range of Motion, Manual Therapy - Soft Tissue Mobilization, Home Exercise Program, Patient Education, Aquatics and Modalities    []  Plan of care initiated. Plan to see patient 2 times per week for 12 weeks to address the treatment planned outlined above.   [x]  Continue with current plan of care  []  Modify plan of care as follows:    []  Hold pending physician visit  []  Discharge    Time In 1300   Time Out 1340   Timed Code Minutes: 40 min   Total Treatment Time: 40 min       Electronically Signed by: Dylon Gutierrez

## 2021-03-17 ENCOUNTER — HOSPITAL ENCOUNTER (OUTPATIENT)
Dept: PHYSICAL THERAPY | Age: 71
Setting detail: THERAPIES SERIES
Discharge: HOME | End: 2021-03-17
Payer: MEDICARE

## 2021-03-17 PROCEDURE — 97110 THERAPEUTIC EXERCISES: CPT

## 2021-03-17 NOTE — PROGRESS NOTES
[address] [facility]  PHYSICAL THERAPY  [] EVALUATION  [x] DAILY NOTE (LAND) [] DAILY NOTE (AQUATIC ) [] PROGRESS NOTE [] DISCHARGE NOTE    [x] OUTPATIENT REHABILITATION CENTER [facility]   [] [place] [address]    [] [facility]   [] Gil Picking    Date: 3/17/2021  Patient Name:  Jacinto Prather  : 1950  MRN: [id number]  CSN: [id number]    Referring Practitioner Ml Owen MD   Diagnosis Lumbago with sciatica, right side [M54.41]    Treatment Diagnosis Acute on chronic right lumbar pain with sciatica, core and hip weakness, leg length discrepancy   Date of Evaluation 21    Additional Pertinent History HTN, osteoporosis. Functional Outcome Measure Used Oswestry   Functional Outcome Score = 18% (21)       Insurance: Primary: Payor: Minda Hall /  /  / ,   Secondary:    Authorization Information: Aquatics and modalities covered except ionto, NO telehealth   Visit # 6, 6/10 for progress note   Visits Allowed: Unlimited    Recertification Date:    Physician Follow-Up: 21   Physician Orders:    History of Present Illness: 4 years ago, pt had XR which showed degeneration of SIJ, so had injections which helped. Pain returned a year ago, took Aleve and went away in 10 days. Now in January to developed pain in right hip with tingling in foot. Pt does yoga which helps, denies doing any prior PT for this issue. SUBJECTIVE: Patient notes her symptoms are no longer extending into her toes and seem to be centralizing to her thigh and low back. Patient does feel the intensity can increase even more so especially with prolonged standing. Patient also notes difficulty turning in her bed. Patient unable to sleep on R side due to pain. Patient does feel her recovery time is improving.      TREATMENT   Precautions:    Pain: 4/10 right hip/thigh    X in shaded column indicates activity completed today   Modalities Parameters/  Location  Notes Manual Therapy Time/Technique  Notes   Long leg traction right 2x30 sec  Perform again next visit, didn't have time to perform today   Piriformis release right x5 min x Less thigh pain         Exercise/Intervention   Notes   Nustep warm up no arms  5 minutes   X Level 4   Supine hamstring stretch with strap 2x30\"      Supine piriformis figure 4 stretch 2x30\"  X B   Supine piriformis stretch pushing into ER 2 x 30\"  X B   Ab brace  10x3\" 10 minutes  continued instruction on self hand placements, isolation of TrA and breathing patterns     Ab brace with ball squeeze and ab brace with hip abduction 10x3\" orange  D/C to home program   Ab brace alternating UE and LE extension, SLR, unilateral knee fall out, heel slide 15 x each  X Dead bug and SLR only (increased thigh pain when performing on R side)   Ab iso with bridge hands on stomch, bridge with knee extension 15x each 3 seconds X           Sidelying hip abduction and clam, B 10x each 3 seconds X    Quadruped ab bracing, with alt UEs and alt LEs  10 5 sec x    Seated on yellow stability ball: ab bracing, alt march, alt LAQ, alt march with opp UE raise  10 5 sec x           hydrostick all directions with ab brace  x10 ew  X             Specific Interventions Next Treatment: continue trigger point release to piriformis, piriformis and hamstring stretching, core stabilization    Activity/Treatment Tolerance:  [x]  Patient tolerated treatment well  []  Patient limited by fatigue  []  Patient limited by pain   []  Patient limited by medical complications  []  Other:     Assessment: Palpable spasms noted in R piriformis that dissipates about 50% following manual interventions. Patient is able to maintain neutral spine positioning with advanced progressions this date. GOALS:  Patient Goal: Decrease right leg pain    Short Term Goals:  Time Frame: 6 weeks  Patient will report reduced pain to 3/10 to tolerate sitting and laying down to sleep.    Patient

## 2021-03-19 ENCOUNTER — HOSPITAL ENCOUNTER (OUTPATIENT)
Dept: PHYSICAL THERAPY | Age: 71
Setting detail: THERAPIES SERIES
Discharge: HOME | End: 2021-03-19
Payer: MEDICARE

## 2021-03-19 PROCEDURE — 97110 THERAPEUTIC EXERCISES: CPT

## 2021-03-19 NOTE — PROGRESS NOTES
[address] [facility]  PHYSICAL THERAPY  [] EVALUATION  [] DAILY NOTE (LAND) [] DAILY NOTE (AQUATIC ) [x] PROGRESS NOTE [] DISCHARGE NOTE    [x] OUTPATIENT REHABILITATION CENTER [facility]   [] [place] [address]    [] [facility]   [] Vanita Cohen    Date: 3/19/2021  Patient Name:  Miguel Ángel Apple  : 1950  MRN: [id number]  CSN: [id number]    Referring Practitioner Beverly Sharma MD   Diagnosis Lumbago with sciatica, right side [M54.41]    Treatment Diagnosis Acute on chronic right lumbar pain with sciatica, core and hip weakness, leg length discrepancy   Date of Evaluation 21    Additional Pertinent History HTN, osteoporosis. Functional Outcome Measure Used Oswestry   Functional Outcome Score 9/50= 18% (21) 850=16% (3/19/21)      Insurance: Primary: Payor: Olive Riddles /  /  / ,   Secondary:    Authorization Information: Aquatics and modalities covered except ionto, NO telehealth   Visit # 7, 0/10 for progress note   Visits Allowed: Unlimited    Recertification Date:    Physician Follow-Up: 21   Physician Orders:    History of Present Illness: 4 years ago, pt had XR which showed degeneration of SIJ, so had injections which helped. Pain returned a year ago, took Aleve and went away in 10 days. Now in January to developed pain in right hip with tingling in foot. Pt does yoga which helps, denies doing any prior PT for this issue. 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 Pain seems to fluctuate but right thigh pain is there majority of the time, just not as intense. Walking helps with pain, sitting more bothersome. Pt reports tennis ball massage aggravates symptoms.      TREATMENT   Precautions:    Pain: 5/10 right hip/thigh    X in shaded column indicates activity completed today   Modalities Parameters/  Location  Notes                     Manual Therapy Time/Technique  Notes   Long leg Patient Education, Aquatics and Modalities    []  Plan of care initiated. Plan to see patient 2 times per week for 12 weeks to address the treatment planned outlined above.   [x]  Continue with current plan of care  []  Modify plan of care as follows:    []  Hold pending physician visit  []  Discharge    Time In 1301   Time Out 1344   Timed Code Minutes: 43 min   Total Treatment Time: 43 min       Electronically Signed by: Nuris Vo

## 2021-03-23 ENCOUNTER — HOSPITAL ENCOUNTER (OUTPATIENT)
Dept: PHYSICAL THERAPY | Age: 71
Setting detail: THERAPIES SERIES
Discharge: HOME | End: 2021-03-23
Payer: MEDICARE

## 2021-03-23 PROCEDURE — 97140 MANUAL THERAPY 1/> REGIONS: CPT

## 2021-03-23 PROCEDURE — 97110 THERAPEUTIC EXERCISES: CPT

## 2021-03-23 NOTE — PROGRESS NOTES
[address] [facility]  PHYSICAL THERAPY  [] EVALUATION  [x] DAILY NOTE (LAND) [] DAILY NOTE (AQUATIC ) [] PROGRESS NOTE [] DISCHARGE NOTE    [x] OUTPATIENT REHABILITATION CENTER [facility]   [] [place] [address]    [] [facility] [place]   [] Maldonado Barksdale    Date: 3/23/2021  Patient Name:  Steve Covarrubias  : 1950  MRN: [id number]  CSN: [id number]    Referring Practitioner Antonio Guzmán MD   Diagnosis Lumbago with sciatica, right side [M54.41]    Treatment Diagnosis Acute on chronic right lumbar pain with sciatica, core and hip weakness, leg length discrepancy   Date of Evaluation 21    Additional Pertinent History HTN, osteoporosis. Functional Outcome Measure Used Oswestry   Functional Outcome Score 50= 18% (21) 50=16% (3/19/21)      Insurance: Primary: Payor: Igor Pollard /  /  / ,   Secondary:    Authorization Information: Aquatics and modalities covered except ionto, NO telehealth   Visit # 8,1/10 for progress note   Visits Allowed: Unlimited    Recertification Date: 69   Physician Follow-Up: 21   Physician Orders:    History of Present Illness: 4 years ago, pt had XR which showed degeneration of SIJ, so had injections which helped. Pain returned a year ago, took Aleve and went away in 10 days. Now in January to developed pain in right hip with tingling in foot. Pt does yoga which helps, denies doing any prior PT for this issue. SUBJECTIVE: Patient reports 3/10 pain upon arrival. 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 Patient reports that her pain has been lower the past 2 days.      TREATMENT   Precautions:    Pain: 3/10 right hip/thigh    X in shaded column indicates activity completed today   Modalities Parameters/  Location  Notes                     Manual Therapy Time/Technique  Notes   Long leg traction right 2x30 sec x Perform again next visit, didn't have time to perform today Piriformis release right x5 min x    Shotgun MET technique to correct R upslip  2x X Neutral pelvis after MET. Continue to check pelvic and sacral alignment at beginning and end of each session   Exercise/Intervention   Notes   Nustep warm up no arms  5 minutes    Level 4   Supine hamstring stretch with strap 2x30\"  X B   Supine piriformis figure 4 stretch 2x30\"  X B   Supine piriformis stretch pushing into ER 2 x 30\"  X B   Ab brace  15x5\"  X continued instruction on self hand placements, isolation of TrA and breathing patterns     Ab brace with ball squeeze and ab brace with hip abduction 15x3\" orange x D/C to home program next visit   Ab brace alternating UE and LE extension, SLR, unilateral knee fall out, heel slide 15 x each  X    Ab iso with bridge hands on stomach, bridge with knee extension 15x each 3 seconds X           Sidelying hip abduction and clam, B 15x each 3 seconds X    Quadruped ab bracing with alt UEs and alt LEs  10 5 sec x    Quadruped ab bracing with fire hydrant, with donkey kick, alt UE and LEs 10 x  X Added today   Seated on yellow stability ball: ab bracing, alt march, alt LAQ, alt march with opp UE raise  15 5 sec x           hydrostick all directions with ab brace fwd/bwd, lateral, CW, CCW x15 ew  X             Specific Interventions Next Treatment: continue trigger point release to piriformis, piriformis and hamstring stretching, core stabilization    Activity/Treatment Tolerance:  [x]  Patient tolerated treatment well  []  Patient limited by fatigue  []  Patient limited by pain   []  Patient limited by medical complications  []  Other:     Assessment: Patient was progressed this date with increased repetitions and additional core strengthening/stabilization exercises. Patient denies any increase in pain with exercises but did have some increased irritation and soreness with piriformis release. Patient did require a MET technique to improve pelvic alignment this date.  Will continue to monitor pelvic and sacral alignment for improved pain. Patient would benefit from continued skilled PT to further improve pain, tissue extensibility, strength and stability to allow improved functional mobility. GOALS:  Patient Goal: Decrease right leg pain    Short Term Goals:  Time Frame: 6 weeks  Patient will report reduced pain to 3/10 to tolerate sitting and laying down to sleep. Patient will demonstrate good core engagement during therapy exercises without cues  to carry over to functional activities, lifting, and housework. Patient will improve B hip strength to 5/5 for stabilization when walking, lifting, and cleaning. Patient will deny right piriformis tenderness to decrease radicular pain for sitting and sleeping. Long Term Goals:  Time Frame: 12 weeks  Patient will be independent and compliant with HEP daily to achieve above goals. Pt will reduce Oswestry score from 18% to 10% to tolerate sitting and sleeping. Patient Education:   [x]  HEP/Education Completed: correct exercise technique with new exercises, improved core stabilization during quadruped exercises and decreased compensations.  [email or web address] Access Code:  []  No new Education completed  []  Reviewed Prior HEP      [x]  Patient verbalized and/or demonstrated understanding of education provided. []  Patient unable to verbalize and/or demonstrate understanding of education provided. Will continue education. []  Barriers to learning:     PLAN:  Treatment Recommendations: Strengthening, Range of Motion, Manual Therapy - Soft Tissue Mobilization, Home Exercise Program, Patient Education, Aquatics and Modalities    []  Plan of care initiated. Plan to see patient 2 times per week for 12 weeks to address the treatment planned outlined above.   [x]  Continue with current plan of care  []  Modify plan of care as follows:    []  Hold pending physician visit  []  Discharge    Time In 1615   Time Out 1700   Timed Code Minutes: 45 min Total Treatment Time: 45 min       Electronically Signed by: Junito Garcia

## 2021-03-25 ENCOUNTER — HOSPITAL ENCOUNTER (OUTPATIENT)
Dept: PHYSICAL THERAPY | Age: 71
Setting detail: THERAPIES SERIES
Discharge: HOME | End: 2021-03-25
Payer: MEDICARE

## 2021-03-25 PROCEDURE — 97110 THERAPEUTIC EXERCISES: CPT

## 2021-03-25 PROCEDURE — 97140 MANUAL THERAPY 1/> REGIONS: CPT

## 2021-03-25 NOTE — PROGRESS NOTES
[facility]  PHYSICAL THERAPY  [] EVALUATION  [x] DAILY NOTE (LAND) [] DAILY NOTE (AQUATIC ) [] PROGRESS NOTE [] DISCHARGE NOTE    [x] OUTPATIENT REHABILITATION [facility]   [] [place] [address]    [] [facility][1]   [] Carry Minder    Date: 3/25/2021  Patient Name:  Duarte Purdy  : 1950  MRN: [id number]  CSN: [id number]    Referring Practitioner Gómez Haney MD   Diagnosis Lumbago with sciatica, right side [M54.41]    Treatment Diagnosis Acute on chronic right lumbar pain with sciatica, core and hip weakness, leg length discrepancy   Date of Evaluation 21    Additional Pertinent History HTN, osteoporosis. Functional Outcome Measure Used Oswestry   Functional Outcome Score 50= 18% (21) 50=16% (3/19/21)      Insurance: Primary: Payor: Mar Lynn /  /  / ,   Secondary:    Authorization Information: Aquatics and modalities covered except ionto, NO telehealth   Visit # 9, 2/10 for progress note   Visits Allowed: Unlimited    Recertification Date:    Physician Follow-Up: 21   Physician Orders:    History of Present Illness: 4 years ago, pt had XR which showed degeneration of SIJ, so had injections which helped. Pain returned a year ago, took Aleve and went away in 10 days. Now in January to developed pain in right hip with tingling in foot. Pt does yoga which helps, denies doing any prior PT for this issue. SUBJECTIVE: Patient reports 3/10 pain upon arrival. Patient did have some increased muscle soreness after last session but it did not last too long. Patient reports that her pain was low yesterday and today.      TREATMENT   Precautions:    Pain: 2/10 right hip/thigh    X in shaded column indicates activity completed today   Modalities Parameters/  Location  Notes                     Manual Therapy Time/Technique  Notes   Long leg traction right 2x30 sec  Perform again next visit, didn't have time to perform today   Piriformis release right x5 min  Not performed today due to time   MET technique yuridia R hamstring and L hip flexor to improve R anterior pelvic rotation 2x at beginning of session X Neutral pelvis after MET. Pelvis was neutral at the end of the session. Continue to check pelvic and sacral alignment at beginning and end of each session   Exercise/Intervention   Notes   Nustep warm up no arms  5 minutes    Level 4   Supine hamstring stretch with strap 2x30\"  X B   Supine piriformis figure 4 stretch 2x30\"  X B   Supine piriformis stretch pushing into ER 2 x 30\"  X B   Ab brace  15x5\"   D/C to home program     Ab brace with ball squeeze and ab brace with hip abduction 15x3\" orange  D/C to home program   Ab brace alternating UE and LE extension, SLR, unilateral knee fall out, heel slide 15 x each  X    Ab iso with bridge hands on stomach, bridge with knee extension, bridge with hip abduction, bridge with march 15x each 3 seconds  orange X Cues for correct exercise technique and stabilizing core with level pelvis.  Added bridge with abduction and march today          Sidelying hip abduction and clam, B 15x each 3 seconds X    Quadruped ab bracing with alt UEs and alt LEs  10 5 sec x    Quadruped ab bracing with fire hydrant, with donkey kick, alt UE and LEs 10 x  X    Seated on yellow stability ball: ab bracing, alt march, alt LAQ, alt march with opp UE raise  15 5 sec x           hydrostick all directions with ab brace fwd/bwd, lateral, CW, CCW x15 ew  X    Cable column Rows with abdominal brace 15 x 20# X    Pall off press with abdominal brace  Standing abdominal isometric with shoulder horizontal abduction  Standing abdominal isometric with shoulder flexion while holding into horizontal abduction 15 x each  15 x     15 x  Orange  Orange    orange X  X    X      Specific Interventions Next Treatment: continue trigger point release to piriformis, piriformis and hamstring stretching, core stabilization Activity/Treatment Tolerance:  [x]  Patient tolerated treatment well  []  Patient limited by fatigue  []  Patient limited by pain   []  Patient limited by medical complications  []  Other:     Assessment: Patient was progressed this date with new stabilization exercises for more advanced core strengthening. Patient did require MET technique to correct R anterior pelvic rotation at the beginning of the session. Pelvis alignment neutral after the session. Patient did require verbal and visual instruction for correct exercise technique with new exercises. Patient did report a mild increase in muscle soreness in soreness today with session. Patient would benefit from continued skilled PT to further improve pain, tissue extensibility, strength and stability to allow improved functional mobility. GOALS:  Patient Goal: Decrease right leg pain    Short Term Goals:  Time Frame: 6 weeks  Patient will report reduced pain to 3/10 to tolerate sitting and laying down to sleep. Patient will demonstrate good core engagement during therapy exercises without cues  to carry over to functional activities, lifting, and housework. Patient will improve B hip strength to 5/5 for stabilization when walking, lifting, and cleaning. Patient will deny right piriformis tenderness to decrease radicular pain for sitting and sleeping. Long Term Goals:  Time Frame: 12 weeks  Patient will be independent and compliant with HEP daily to achieve above goals. Pt will reduce Oswestry score from 18% to 10% to tolerate sitting and sleeping. Patient Education:   [x]  HEP/Education Completed: continued education for correct exercise technique with new exercises, improved core stabilization during quadruped exercises for decreased compensations.  [employer] Access Code:  []  No new Education completed  []  Reviewed Prior HEP      [x]  Patient verbalized and/or demonstrated understanding of education provided.   []  Patient unable to verbalize and/or demonstrate understanding of education provided. Will continue education. []  Barriers to learning:     PLAN:  Treatment Recommendations: Strengthening, Range of Motion, Manual Therapy - Soft Tissue Mobilization, Home Exercise Program, Patient Education, Aquatics and Modalities    []  Plan of care initiated. Plan to see patient 2 times per week for 12 weeks to address the treatment planned outlined above.   [x]  Continue with current plan of care  []  Modify plan of care as follows:    []  Hold pending physician visit  []  Discharge    Time In 1132   Time Out 1213   Timed Code Minutes: 41 min   Total Treatment Time: 41 min       Electronically Signed by: Linnie Curling

## 2021-03-26 ENCOUNTER — APPOINTMENT (OUTPATIENT)
Dept: PHYSICAL THERAPY | Age: 71
End: 2021-03-26
Payer: MEDICARE

## 2021-03-31 ENCOUNTER — HOSPITAL ENCOUNTER (OUTPATIENT)
Dept: PHYSICAL THERAPY | Age: 71
Setting detail: THERAPIES SERIES
Discharge: HOME | End: 2021-03-31
Payer: MEDICARE

## 2021-03-31 PROCEDURE — 97110 THERAPEUTIC EXERCISES: CPT

## 2021-03-31 NOTE — PROGRESS NOTES
[address] [facility]  PHYSICAL THERAPY  [] EVALUATION  [x] DAILY NOTE (LAND) [] DAILY NOTE (AQUATIC ) [] PROGRESS NOTE [] DISCHARGE NOTE    [x] OUTPATIENT REHABILITATION CENTER [facility]   [] [place] [address]    [] [facility]   [] Akua Paget    Date: 3/31/2021  Patient Name:  Misha Quezada  : 1950  MRN: [id number]  CSN: [id number]    Referring Practitioner Nash Ellsworth MD   Diagnosis Lumbago with sciatica, right side [M54.41]    Treatment Diagnosis Acute on chronic right lumbar pain with sciatica, core and hip weakness, leg length discrepancy   Date of Evaluation 21    Additional Pertinent History HTN, osteoporosis. Functional Outcome Measure Used Oswestry   Functional Outcome Score 950= 18% (21) 850=16% (3/19/21)      Insurance: Primary: Payor: Ronni Mercer /  /  / ,   Secondary:    Authorization Information: Aquatics and modalities covered except ionto, NO telehealth   Visit # 10, 3/10 for progress note   Visits Allowed: Unlimited    Recertification Date: 63   Physician Follow-Up: 21   Physician Orders:    History of Present Illness: 4 years ago, pt had XR which showed degeneration of SIJ, so had injections which helped. Pain returned a year ago, took Aleve and went away in 10 days. Now in January to developed pain in right hip with tingling in foot. Pt does yoga which helps, denies doing any prior PT for this issue. SUBJECTIVE: Patient reports she has been feeling pretty good. She doesn't have to take Aleve much. Today she has very mild pain, just enough to notice it.      TREATMENT   Precautions:    Pain: 1/10 right hip/thigh    X in shaded column indicates activity completed today   Modalities Parameters/  Location  Notes                     Manual Therapy Time/Technique  Notes   Long leg traction right 2x30 sec  Perform again next visit, didn't have time to perform today   Piriformis release right x5 min  Not performed today due to time   MET technique yuridia R hamstring and L hip flexor to improve R anterior pelvic rotation 2x at beginning of session  Neutral pelvis after MET. Pelvis was neutral at the end of the session.  Continue to check pelvic and sacral alignment at beginning and end of each session   Exercise/Intervention   Notes   Nustep warm up no arms  5 minutes    Level 4   Supine hamstring stretch with strap 2x30\"  X B   Supine piriformis figure 4 stretch 2x30\"  X B   Supine piriformis stretch pushing into ER 2 x 30\"  X B   Ab brace  15x5\"   D/C to home program     Ab brace with ball squeeze and ab brace with hip abduction 15x3\" orange  Hip adduction ONLY   Ab brace alternating UE and LE extension, SLR, unilateral knee fall out, heel slide 15 x each  X    Ab iso with bridge hands on stomach, bridge with knee extension, bridge with hip abduction with tband, bridge with march 15x each 3 seconds  orange X           Sidelying hip abduction and clam with tband, B 15x each 3 seconds X orange   Quadruped ab bracing with alt UEs and alt LEs  10  x    Quadruped ab bracing with fire hydrant, with donkey kick, alt UE and LEs 10 x  X    Seated on yellow stability ball: ab bracing with alt march, alt LAQ, alt march with opp UE raise  15  x    Hydrohip bilat and unilat 15 Level 4 x    Hydrochest bilat and unilat 15 Level 4/level 3 x    hydrostick all directions with ab brace fwd/bwd, lateral, CW, CCW x15 ew  X    Cable column Rows with abdominal brace 15 x 20# X    Pall off press with abdominal brace  Standing abdominal isometric with shoulder horizontal abduction  Standing abdominal isometric with shoulder flexion while holding into horizontal abduction 15 x each  15 x     15 x  Orange  Orange    orange X  X    X      Specific Interventions Next Treatment: continue trigger point release to piriformis, piriformis and hamstring stretching, core stabilization    Activity/Treatment Tolerance:  [x]  Patient tolerated treatment well []  Patient limited by fatigue  []  Patient limited by pain   []  Patient limited by medical complications  []  Other:     Assessment: Continued with strengthening and stabilization program, adding hydrohip and hydrochest with mild pain toward end of session. Pt reports she always recovers fast. Pt required few cues for core engagement and exercise technique. Pelvis aligned therefore no MET required. GOALS:  Patient Goal: Decrease right leg pain    Short Term Goals:  Time Frame: 6 weeks  Patient will report reduced pain to 3/10 to tolerate sitting and laying down to sleep. Patient will demonstrate good core engagement during therapy exercises without cues  to carry over to functional activities, lifting, and housework. Patient will improve B hip strength to 5/5 for stabilization when walking, lifting, and cleaning. Patient will deny right piriformis tenderness to decrease radicular pain for sitting and sleeping. Long Term Goals:  Time Frame: 12 weeks  Patient will be independent and compliant with HEP daily to achieve above goals. Pt will reduce Oswestry score from 18% to 10% to tolerate sitting and sleeping. Patient Education:   []  HEP/Education Completed: continued education for correct exercise technique with new exercises, improved core stabilization during quadruped exercises for decreased compensations.  [employer] Access Code:  [x]  No new Education completed  []  Reviewed Prior HEP      [x]  Patient verbalized and/or demonstrated understanding of education provided. []  Patient unable to verbalize and/or demonstrate understanding of education provided. Will continue education. []  Barriers to learning:     PLAN:  Treatment Recommendations: Strengthening, Range of Motion, Manual Therapy - Soft Tissue Mobilization, Home Exercise Program, Patient Education, Aquatics and Modalities    []  Plan of care initiated.   Plan to see patient 2 times per week for 12 weeks to address the treatment planned outlined above.   [x]  Continue with current plan of care  []  Modify plan of care as follows:    []  Hold pending physician visit  []  Discharge    Time In 1430   Time Out 1508   Timed Code Minutes: 38 min   Total Treatment Time: 38 min       Electronically Signed by: Ana Hook

## 2021-04-02 ENCOUNTER — HOSPITAL ENCOUNTER (OUTPATIENT)
Dept: PHYSICAL THERAPY | Age: 71
Setting detail: THERAPIES SERIES
Discharge: HOME | End: 2021-04-02
Payer: MEDICARE

## 2021-04-02 PROCEDURE — 97110 THERAPEUTIC EXERCISES: CPT

## 2021-04-02 NOTE — PROGRESS NOTES
[address] [facility]  PHYSICAL THERAPY  [] EVALUATION  [x] DAILY NOTE (LAND) [] DAILY NOTE (AQUATIC ) [] PROGRESS NOTE [] DISCHARGE NOTE    [x] OUTPATIENT REHABILITATION CENTER [facility]   [] [place] [address]    [] [facility]   [] Fifi Lynch    Date: 2021  Patient Name:  Nora Moseley  : 1950  MRN: [id number]  CSN: [id number]    Referring Practitioner Luis Felipe Sierra MD   Diagnosis Lumbago with sciatica, right side [M54.41]    Treatment Diagnosis Acute on chronic right lumbar pain with sciatica, core and hip weakness, leg length discrepancy   Date of Evaluation 21    Additional Pertinent History HTN, osteoporosis. Functional Outcome Measure Used Oswestry   Functional Outcome Score 50= 18% (21) 50=16% (3/19/21)      Insurance: Primary: Payor: Enedelia Rubio /  /  / ,   Secondary:    Authorization Information: Aquatics and modalities covered except ionto, NO telehealth   Visit # 16, 4/10 for progress note   Visits Allowed: Unlimited    Recertification Date:    Physician Follow-Up: 21   Physician Orders:    History of Present Illness: 4 years ago, pt had XR which showed degeneration of SIJ, so had injections which helped. Pain returned a year ago, took Aleve and went away in 10 days. Now in January to developed pain in right hip with tingling in foot. Pt does yoga which helps, denies doing any prior PT for this issue. SUBJECTIVE: Patient reports that her pain is slowly going away. Patients 1/10 pain upon arrival. Patient reports that she has seen an improvement in pain level and decreased frequency of pain since start of therapy.      TREATMENT   Precautions:    Pain: 1/10 right hip/thigh    X in shaded column indicates activity completed today   Modalities Parameters/  Location  Notes                     Manual Therapy Time/Technique  Notes   Long leg traction right 2x30 sec  Perform again next visit, didn't have time to perform today   Piriformis release right x5 min  Not performed today due to time   MET technique yuridia R hamstring and L hip flexor to improve R anterior pelvic rotation 2x at beginning of session  Neutral pelvis after MET. Pelvis was neutral at the end of the session.  Continue to check pelvic and sacral alignment at beginning and end of each session   Exercise/Intervention   Notes   Nustep warm up no arms  5 minutes    Level 4   Supine hamstring stretch with strap 2x30\"  X B   Supine piriformis figure 4 stretch 2x30\"  X B   Supine piriformis stretch pushing into ER 2 x 30\"  X B   Ab brace  15x5\"   D/C to home program     Ab brace with ball squeeze and ab brace with hip abduction 15x3\" orange  Hip adduction ONLY   Ab brace alternating UE and LE extension, SLR, unilateral knee fall out, heel slide 15 x each  X    Ab iso with bridge hands on stomach, bridge with knee extension, bridge with hip abduction with tband, bridge with march 15x each 3 seconds  orange X           Sidelying hip abduction and clam with tband, B 15x each 3 seconds X orange   Quadruped ab bracing with alt UEs and alt LEs  10 x  x    Quadruped ab bracing with fire hydrant, with donkey kick, alt UE and LEs 10 x  X    Seated on yellow stability ball: ab bracing with alt march, alt LAQ, alt march with opp UE raise  15  x    Hydrohip bilat and unilat 15 Level 4 x    Hydrochest bilat and unilat 15 Level 4/level 3 x    hydrostick all directions with ab brace fwd/bwd, lateral, CW, CCW x15 ew  X    Cable column Rows with abdominal brace 15 x 20# X    Pall off press with abdominal brace  Standing abdominal isometric with shoulder horizontal abduction  Standing abdominal isometric with shoulder flexion while holding into horizontal abduction 15 x each  15 x     15 x  Orange  Orange    orange X  X    X      Specific Interventions Next Treatment: continue trigger point release to piriformis, piriformis and hamstring stretching, core stabilization    Activity/Treatment Tolerance:  [x]  Patient tolerated treatment well  []  Patient limited by fatigue  []  Patient limited by pain   []  Patient limited by medical complications  []  Other:     Assessment: Patients pelvic alignment was neutral upon arrival today. Patient is still challenged with physioball core stabilization exercises with cues for least amount of UE support and quadruped exercises with cues to maintain level pelvis. Patient had level pelvis after session therefore no MMT was needed. Patient denies any increase in pain throughout the session but does report a muscle soreness and fatigue. Patient would benefit from skilled PT to further improve pain and core strength/stability to allow improved functional mobility. GOALS:  Patient Goal: Decrease right leg pain    Short Term Goals:  Time Frame: 6 weeks  Patient will report reduced pain to 3/10 to tolerate sitting and laying down to sleep. Patient will demonstrate good core engagement during therapy exercises without cues  to carry over to functional activities, lifting, and housework. Patient will improve B hip strength to 5/5 for stabilization when walking, lifting, and cleaning. Patient will deny right piriformis tenderness to decrease radicular pain for sitting and sleeping. Long Term Goals:  Time Frame: 12 weeks  Patient will be independent and compliant with HEP daily to achieve above goals. Pt will reduce Oswestry score from 18% to 10% to tolerate sitting and sleeping. Patient Education:   [x]  HEP/Education Completed: least amount of UE support during physioball exercises and maintaining level pelvis during quadruped exercises.  [address] Access Code:  []  No new Education completed  []  Reviewed Prior HEP      [x]  Patient verbalized and/or demonstrated understanding of education provided. []  Patient unable to verbalize and/or demonstrate understanding of education provided. Will continue education.   []

## 2021-04-07 ENCOUNTER — HOSPITAL ENCOUNTER (OUTPATIENT)
Dept: PHYSICAL THERAPY | Age: 71
Setting detail: THERAPIES SERIES
Discharge: HOME | End: 2021-04-07
Payer: MEDICARE

## 2021-04-07 PROCEDURE — 97110 THERAPEUTIC EXERCISES: CPT

## 2021-04-07 NOTE — PROGRESS NOTES
Piriformis release right x5 min  Not performed today due to time   MET technique yuridia R hamstring and L hip flexor to improve R anterior pelvic rotation 2x at beginning of session  Neutral pelvis after MET. Pelvis was neutral at the end of the session.  Continue to check pelvic and sacral alignment at beginning and end of each session   Exercise/Intervention   Notes   Nustep warm up no arms  5 minutes    Level 4   Supine hamstring stretch with strap 2x30\"   B   Supine piriformis figure 4 stretch 1x30\"  X B   Supine piriformis stretch pushing into ER 1 x 30\"  X B   Ab brace  15x5\"   D/C to home program     Ab brace with ball squeeze and ab brace with hip abduction 15x3\" orange  Hip adduction ONLY   Ab brace alternating UE and LE extension, SLR, unilateral knee fall out, heel slide 15 x each      Ab iso with bridge hands on stomach, bridge with knee extension, bridge with hip abduction with tband, bridge with march and opp UE raise  10x each 3 seconds  orange X With feet on 4 inch step   Hip hike  5x5\"  X    Side stepping  x2 laps  X                         Sidelying hip abduction and clam with tband,reverse clam with band  B 15x each 3 seconds X Orange, clams only today    Quadruped ab bracing with alt UEs and alt LEs  10 x      Quadruped ab bracing with fire hydrant, with donkey kick, alt UE and LEs 10 x      Seated on yellow stability ball: ab bracing with alt march, alt LAQ, alt march with opp UE raise  15      Hydrohip bilat and unilat 15 Level 4     Hydrochest bilat and unilat 15 Level 4/level 3     hydrostick all directions with ab brace fwd/bwd, lateral, CW, CCW x15 ew      Cable column Rows with abdominal brace 15 x 20#     Tall kneeling on chair: rows, shoulder extension and pall off press  10x  orange X    Pall off press with abdominal brace  Standing abdominal isometric with shoulder horizontal abduction  Standing abdominal isometric with shoulder flexion while holding into horizontal abduction 15 x each  15 x     15 x  Orange  Orange    orange             Specific Interventions Next Treatment: continue trigger point release to piriformis, piriformis and hamstring stretching, core stabilization    Activity/Treatment Tolerance:  [x]  Patient tolerated treatment well  []  Patient limited by fatigue  []  Patient limited by pain   []  Patient limited by medical complications  []  Other:     Assessment: Time spent today addressing patient questions on progressing exercises and modifications or other options to strengthen glut med as side lying hip abduction contributes to increased symptoms in lateral hip lately. Patient tolerates today's session well and was instructed to gradually initiate these into current program. Patient denies increase in pain at conclusion of treatment session but does note global fatigue. Patient benefits from a few cues on maintaining neutral spine positioning but is able to correct once verbal feedback is provided. GOALS:  Patient Goal: Decrease right leg pain    Short Term Goals:  Time Frame: 6 weeks  Patient will report reduced pain to 3/10 to tolerate sitting and laying down to sleep. Patient will demonstrate good core engagement during therapy exercises without cues  to carry over to functional activities, lifting, and housework. Patient will improve B hip strength to 5/5 for stabilization when walking, lifting, and cleaning. Patient will deny right piriformis tenderness to decrease radicular pain for sitting and sleeping. Long Term Goals:  Time Frame: 12 weeks  Patient will be independent and compliant with HEP daily to achieve above goals. Pt will reduce Oswestry score from 18% to 10% to tolerate sitting and sleeping.     Patient Education:   [x]  HEP/Education Completed: issued handouts of bridge on box exercises, side stepping, hip hike, and tall kneeling with UE resistance exercises    [email or web address] Access Code:  []  No new Education completed  []  Reviewed Prior HEP      [x]  Patient verbalized and/or demonstrated understanding of education provided. []  Patient unable to verbalize and/or demonstrate understanding of education provided. Will continue education. []  Barriers to learning:     PLAN:  Treatment Recommendations: Strengthening, Range of Motion, Manual Therapy - Soft Tissue Mobilization, Home Exercise Program, Patient Education, Aquatics and Modalities    []  Plan of care initiated. Plan to see patient 2 times per week for 12 weeks to address the treatment planned outlined above.   [x]  Continue with current plan of care  []  Modify plan of care as follows:    []  Hold pending physician visit  []  Discharge    Time In 1300   Time Out 1345   Timed Code Minutes: 45 min   Total Treatment Time: 45 min       Electronically Signed by: Ely Pang

## 2021-04-09 ENCOUNTER — HOSPITAL ENCOUNTER (OUTPATIENT)
Dept: PHYSICAL THERAPY | Age: 71
Setting detail: THERAPIES SERIES
Discharge: HOME | End: 2021-04-09
Payer: MEDICARE

## 2021-04-09 PROCEDURE — 97110 THERAPEUTIC EXERCISES: CPT

## 2021-04-09 NOTE — DISCHARGE SUMMARY
[address] [facility]  PHYSICAL THERAPY  [] EVALUATION  [] DAILY NOTE (LAND) [] DAILY NOTE (AQUATIC )  [] PROGRESS NOTE [x] DISCHARGE NOTE    [x] [facility] [place]   [] [place] [address]    [] [facility]   [] Raul Benton    Date: 2021  Patient Name:  Blessing Friday  : 1950  MRN: [id number]  CSN: [id number]    Referring Practitioner Emily Boswell MD   Diagnosis Lumbago with sciatica, right side [M54.41]    Treatment Diagnosis Acute on chronic right lumbar pain with sciatica, core and hip weakness, leg length discrepancy   Date of Evaluation 21    Additional Pertinent History HTN, osteoporosis. Functional Outcome Measure Used Oswestry   Functional Outcome Score -18% disability (21); = 18% (21) =16% (3/19/21)      Insurance: Primary: Payor: [address] [place] [address] /  /  / ,   Secondary:    Authorization Information: Aquatics and modalities covered except ionto, NO telehealth   Visit # 15,  for progress note   Visits Allowed: Unlimited    Recertification Date:    Physician Follow-Up: 21   Physician Orders:    History of Present Illness: 4 years ago, pt had XR which showed degeneration of SIJ, so had injections which helped. Pain returned a year ago, took Aleve and went away in 10 days. Now in January to developed pain in right hip with tingling in foot. Pt does yoga which helps, denies doing any prior PT for this issue. SUBJECTIVE: Patient rates overall improvement with PT 75%. Patient reports she still has R lateral thigh pain and still has difficulty with sleeping on R side. Standing in one place remains limited to ~30 minutes but walking is relatively unaffected by back pain. Pain in R hip and thigh is 3/10 today. Patient notes she is taking less Aleve now than at evaluation. Tingling in R foot is nearly completely resolved at this point.      TREATMENT   Precautions:    Pain: 3/10 right hip/thigh    X in shaded column indicates activity completed today   Modalities Parameters/  Location  Notes                     Manual Therapy Time/Technique  Notes   Long leg traction right 2x30 sec  Perform again next visit, didn't have time to perform today   Piriformis release right x5 min  Not performed today due to time   MET technique yuridia R hamstring and L hip flexor to improve R anterior pelvic rotation 2x at beginning of session  Neutral pelvis after MET. Pelvis was neutral at the end of the session.  Continue to check pelvic and sacral alignment at beginning and end of each session   Exercise/Intervention   Notes   Nustep warm up no arms  5 minutes    Level 4   Supine hamstring stretch with strap 2x30\"   B   Supine piriformis figure 4 stretch 1x30\"  X B   Supine piriformis stretch pushing into ER 1 x 30\"  X B   Ab brace  15x5\"   D/C to home program     Ab brace with ball squeeze and ab brace with hip abduction 15x3\" orange  Hip adduction ONLY   Ab brace alternating UE and LE extension, SLR, unilateral knee fall out, heel slide 15 x each      Ab iso with bridge hands on stomach, bridge with knee extension, bridge with hip abduction with tband, bridge with march and opp UE raise  10x each 3 seconds  orange X With feet on 4 inch step   Hip hike  5x5\"  X    Side stepping  x2 laps  X                         Sidelying hip abduction and clam with tband,reverse clam with band  B 15x each 3 seconds X green, clams only today    Quadruped ab bracing with alt UEs and alt LEs  10 x  X    Quadruped ab bracing with fire hydrant, with donkey kick, alt UE and LEs 10 x      Seated on yellow stability ball: ab bracing with alt march, alt LAQ, alt march with opp UE raise  15      Hydrohip bilat and unilat 15 Level 4 X    Hydrochest bilat and unilat 15 Level 4 X    hydrostick all directions with ab brace fwd/bwd, lateral, CW, CCW x15 ew      Cable Column reactive rows 10x 20# X    Cable column Rows with abdominal brace 15 x 20# X    Tall kneeling on chair: rows, shoulder extension and pall off press  10x  orange X    Pall off press with abdominal brace  Standing abdominal isometric with shoulder horizontal abduction  Standing abdominal isometric with shoulder flexion while holding into horizontal abduction   15 x each  15 x     15 x  Orange  Orange    orange             Specific Interventions Next Treatment: continue trigger point release to piriformis, piriformis and hamstring stretching, core stabilization    Activity/Treatment Tolerance:  [x]  Patient tolerated treatment well  []  Patient limited by fatigue  []  Patient limited by pain   []  Patient limited by medical complications  []  Other:     Assessment: Patient subjectively rates improvement 75% and notes that the tingling in her R foot is rare at this point. OTC pain medication usage is decreased as well. Patient demonstrates reduced palpable tenderness in R piriformis but does have ongoing tightness and tenderness in R ITBand that continues to impact sleeping on R side (does take longer for pain onset when on R side but still is not well tolerated for long periods). Patient demonstrates improved core muscle activation and hip strength has increased. Slight pelvic obliquity is noted with single limb bridging. Oswestry score is unchanged from last PN. Patient continues to do yoga 3x/week and remains compliant with HEP with readiness for discharge to [facility]. See goal status below, patient has met or partially met several goals. GOALS:  Patient Goal: Decrease right leg pain    Short Term Goals:  Time Frame: 6 weeks  Patient will report reduced pain to 3/10 to tolerate sitting and laying down to sleep. GOAL PARTIALLY MET- Patient reports pain remains <3/10 but is still unable to tolerate lying on R side. Sitting is unrestricted.    Patient will demonstrate good core engagement during therapy exercises without cues  to carry over to functional activities, lifting, and housework. GOAL MET- Patient can now isolate TrA and maintain contraction without cues, has advanced strengthening program without flare up. Patient will improve B hip strength to 5/5 for stabilization when walking, lifting, and cleaning. GOAL PARTIALLY MET- Hip abduction remains 4+/5 and extension 4+/5 but flexion is 5/5 now. Patient will deny right piriformis tenderness to decrease radicular pain for sitting and sleeping. GOAL MET- Patient denies tenderness with palpation of R piriformis but does have some along R ITBand now. Long Term Goals:  Time Frame: 12 weeks  Patient will be independent and compliant with HEP daily to achieve above goals. GOAL MET- patient does HEP daily and denies questions or concerns. Pt will reduce Oswestry score from 18% to 10% to tolerate sitting and sleeping. GOAL NOT MET- Score today was again 9/50-18% with no change since last PN. Patient Education:   [x]  HEP/Education Completed: issued handouts of bridge on box exercises, side stepping, hip hike, and tall kneeling with UE resistance exercises    [employer] Access Code:  []  No new Education completed  []  Reviewed Prior HEP      [x]  Patient verbalized and/or demonstrated understanding of education provided. []  Patient unable to verbalize and/or demonstrate understanding of education provided. Will continue education. []  Barriers to learning:     PLAN:  Treatment Recommendations: Strengthening, Range of Motion, Manual Therapy - Soft Tissue Mobilization, Home Exercise Program, Patient Education, Aquatics and Modalities    []  Plan of care initiated. Plan to see patient 2 times per week for 12 weeks to address the treatment planned outlined above.   []  Continue with current plan of care  []  Modify plan of care as follows:    []  Hold pending physician visit  [x]  Discharge    Time In 1300   Time Out 1340   Timed Code Minutes: 40 min   Total Treatment Time: 40 min       Electronically Signed by: Pricilla Ferrera Richi Noriega

## 2021-04-12 DIAGNOSIS — I10 ESSENTIAL HYPERTENSION: ICD-10-CM

## 2021-04-16 RX ORDER — LISINOPRIL 5 MG/1
TABLET ORAL
Qty: 90 TABLET | Refills: 1 | Status: SHIPPED | OUTPATIENT
Start: 2021-04-16 | End: 2021-10-28 | Stop reason: SDUPTHER

## 2021-04-25 ASSESSMENT — LIFESTYLE VARIABLES
HOW OFTEN DO YOU HAVE A DRINK CONTAINING ALCOHOL: 4
AUDIT TOTAL SCORE: 0
HOW OFTEN DURING THE LAST YEAR HAVE YOU FOUND THAT YOU WERE NOT ABLE TO STOP DRINKING ONCE YOU HAD STARTED: NEVER
HAVE YOU OR SOMEONE ELSE BEEN INJURED AS A RESULT OF YOUR DRINKING: NO
HOW OFTEN DURING THE LAST YEAR HAVE YOU FOUND THAT YOU WERE NOT ABLE TO STOP DRINKING ONCE YOU HAD STARTED: 0
HOW OFTEN DURING THE LAST YEAR HAVE YOU HAD A FEELING OF GUILT OR REMORSE AFTER DRINKING: NEVER
HOW OFTEN DURING THE LAST YEAR HAVE YOU BEEN UNABLE TO REMEMBER WHAT HAPPENED THE NIGHT BEFORE BECAUSE YOU HAD BEEN DRINKING: NEVER
HOW OFTEN DURING THE LAST YEAR HAVE YOU FAILED TO DO WHAT WAS NORMALLY EXPECTED FROM YOU BECAUSE OF DRINKING: NEVER
AUDIT TOTAL SCORE: 4
HOW OFTEN DURING THE LAST YEAR HAVE YOU NEEDED AN ALCOHOLIC DRINK FIRST THING IN THE MORNING TO GET YOURSELF GOING AFTER A NIGHT OF HEAVY DRINKING: NEVER
HOW OFTEN DO YOU HAVE A DRINK CONTAINING ALCOHOL: FOUR OR MORE TIMES A WEEK
AUDIT-C TOTAL SCORE: 0
HOW OFTEN DO YOU HAVE SIX OR MORE DRINKS ON ONE OCCASION: NEVER
HOW MANY STANDARD DRINKS CONTAINING ALCOHOL DO YOU HAVE ON A TYPICAL DAY: ONE OR TWO
HAS A RELATIVE, FRIEND, DOCTOR, OR ANOTHER HEALTH PROFESSIONAL EXPRESSED CONCERN ABOUT YOUR DRINKING OR SUGGESTED YOU CUT DOWN: NO
HAVE YOU OR SOMEONE ELSE BEEN INJURED AS A RESULT OF YOUR DRINKING: 0

## 2021-04-25 ASSESSMENT — PATIENT HEALTH QUESTIONNAIRE - PHQ9
SUM OF ALL RESPONSES TO PHQ QUESTIONS 1-9: 0
SUM OF ALL RESPONSES TO PHQ9 QUESTIONS 1 & 2: 0

## 2021-04-26 ENCOUNTER — OFFICE VISIT (OUTPATIENT)
Dept: INTERNAL MEDICINE CLINIC | Age: 71
End: 2021-04-26
Payer: MEDICARE

## 2021-04-26 VITALS
DIASTOLIC BLOOD PRESSURE: 70 MMHG | HEIGHT: 66 IN | TEMPERATURE: 97.8 F | WEIGHT: 112.6 LBS | SYSTOLIC BLOOD PRESSURE: 138 MMHG | HEART RATE: 68 BPM | BODY MASS INDEX: 18.09 KG/M2

## 2021-04-26 DIAGNOSIS — M81.0 OSTEOPOROSIS WITHOUT CURRENT PATHOLOGICAL FRACTURE, UNSPECIFIED OSTEOPOROSIS TYPE: ICD-10-CM

## 2021-04-26 DIAGNOSIS — Z00.00 ROUTINE GENERAL MEDICAL EXAMINATION AT A HEALTH CARE FACILITY: Primary | ICD-10-CM

## 2021-04-26 DIAGNOSIS — I10 ESSENTIAL HYPERTENSION: ICD-10-CM

## 2021-04-26 PROCEDURE — G0439 PPPS, SUBSEQ VISIT: HCPCS | Performed by: INTERNAL MEDICINE

## 2021-04-26 SDOH — ECONOMIC STABILITY: TRANSPORTATION INSECURITY
IN THE PAST 12 MONTHS, HAS LACK OF TRANSPORTATION KEPT YOU FROM MEETINGS, WORK, OR FROM GETTING THINGS NEEDED FOR DAILY LIVING?: NO

## 2021-04-26 SDOH — ECONOMIC STABILITY: INCOME INSECURITY: HOW HARD IS IT FOR YOU TO PAY FOR THE VERY BASICS LIKE FOOD, HOUSING, MEDICAL CARE, AND HEATING?: NOT HARD AT ALL

## 2021-04-26 SDOH — ECONOMIC STABILITY: TRANSPORTATION INSECURITY
IN THE PAST 12 MONTHS, HAS THE LACK OF TRANSPORTATION KEPT YOU FROM MEDICAL APPOINTMENTS OR FROM GETTING MEDICATIONS?: NO

## 2021-04-26 SDOH — ECONOMIC STABILITY: FOOD INSECURITY: WITHIN THE PAST 12 MONTHS, THE FOOD YOU BOUGHT JUST DIDN'T LAST AND YOU DIDN'T HAVE MONEY TO GET MORE.: NEVER TRUE

## 2021-04-26 NOTE — PROGRESS NOTES
Medicare Annual Wellness Visit  Name: Mary Jo Goldstein Date: 2021   MRN: [id number] Sex: Female   Age: 79 y.o. Ethnicity: Non-/Non    : 1950 Race: Ziggy Black is here for Medicare AWV    Screenings for behavioral, psychosocial and functional/safety risks, and cognitive dysfunction are all negative except as indicated below. These results, as well as other patient data from the [address] form, are documented in Flowsheets linked to this Encounter. Allergies   Allergen Reactions    Pcn [Penicillins] Other (See Comments)    Chocolate Nausea And Vomiting    Peppermint Oil Nausea And Vomiting         Prior to Visit Medications    Medication Sig Taking? Authorizing Provider   lisinopril (PRINIVIL;ZESTRIL) 5 MG tablet TAKE 1 TABLET DAILY Yes Ceasar Medrano MD   miconazole nitrate 2 % OINT Apply topically 2 times daily Yes Ceasar Medrano MD   fexofenadine TY [facility] ALLERGY) 180 MG tablet Take 180 mg by mouth daily Yes Historical Provider, MD   acyclovir (ZOVIRAX) 5 % ointment Apply topically every 3 hours Apply topically every 3 hours. Yes Historical Provider, MD   Triamcinolone Acetonide (NASACORT ALLERGY 24HR NA) by Nasal route Yes Historical Provider, MD   NONFORMULARY Compounded hormones 6x a week. Yes Historical Provider, MD   b complex vitamins capsule Take 1 capsule by mouth daily Yes Historical Provider, MD   Calcium Carb-Cholecalciferol (CALCIUM 600 + D PO) Take 1 tablet by mouth daily Yes Historical Provider, MD   estradiol (ESTRING) 2 MG vaginal ring Place 2 mg vaginally once Follow package directions.  Yes Historical Provider, MD   VITAMIN D PO Take 1,000 Units by mouth daily Yes Historical Provider, MD   denosumab (PROLIA) 60 MG/ML SOSY SC injection Inject 60 mg into the skin once Yes Historical Provider, MD         Past Medical History:   Diagnosis Date    Allergic rhinitis     Claritin, Flonase prn    Colon polyp     Dr. Mehdi Leon - colonoscopy done 5/2015, due in 5-7 years (hyperplastic polyps)    Hemorrhoids     s/p banding    Hypertension     Irritable bowel syndrome     Osteoporosis     on Prolia with GYN - [facility].  SI (sacroiliac) joint inflammation (HCC)     s/p SI joint injections - Dr. Tiffanie Queen [facility] due to insurance - 10/2018       Past Surgical History:   Procedure Laterality Date    OVARY REMOVAL  2005    TONSILLECTOMY AND ADENOIDECTOMY           Family History   Adopted: Yes       CareTeam (Including outside providers/suppliers regularly involved in providing care):   Patient Care Team:  Hilton Saint, MD as PCP - General (Internal Medicine)  Hilton Saint, MD as PCP - [facility] Empaneled Provider    Wt Readings from Last 3 Encounters:   04/26/21 112 lb 9.6 oz (51.1 kg)   02/18/21 111 lb 12.8 oz (50.7 kg)   01/08/21 113 lb 9.6 oz (51.5 kg)     Vitals:    04/26/21 1149 04/26/21 1225   BP: (!) 148/82 138/70   Site: Left Upper Arm    Position: Sitting    Cuff Size: Medium Adult    Pulse: 68    Temp: 97.8 °F (36.6 °C)    Weight: 112 lb 9.6 oz (51.1 kg)    Height: 5' 5.98\" (1.676 m)      Body mass index is 18.19 kg/m². Based upon direct observation of the patient, evaluation of cognition reveals recent and remote memory intact. Patient's complete Health Risk Assessment and screening values have been reviewed and are found in Flowsheets. The following problems were reviewed today and where indicated follow up appointments were made and/or referrals ordered. Positive Risk Factor Screenings with Interventions:            General Health and ACP:  General  In general, how would you say your health is?: Excellent  In the past 7 days, have you experienced any of the following?  New or Increased Pain, New or Increased Fatigue, Loneliness, Social Isolation, Stress or Anger?: None of These  Do you get the social and emotional support that you need?: Yes  Do you have a Living Will?: Yes  Advance Directives     Power of  Living Will ACP-Advance Directive ACP-Power of     Not on File Not on File Not on File Not on File      General Health Risk Interventions:  · She gave us her Living will and DPOA paperwork today. Updated in chart    Health Habits/Nutrition:  Health Habits/Nutrition  Do you exercise for at least 20 minutes 2-3 times per week?: Yes  Have you lost any weight without trying in the past 3 months?: No  Do you eat only one meal per day?: No  Have you seen the dentist within the past year?: Appointment is scheduled  Body mass index: (!) 18.18  Health Habits/Nutrition Interventions:  · Nutritional issues:  educational materials for healthy, well-balanced diet provided       Personalized Preventive Plan   Current Health Maintenance Status  Immunization History   Administered Date(s) Administered    Influenza, High Dose (Fluzone 65 yrs and older) 11/16/2020    Zoster Recombinant (Shingrix) 04/01/2019, 07/18/2019        Health Maintenance   Topic Date Due    DTaP/Tdap/Td vaccine (1 - Tdap) Never done    Pneumococcal 65+ years Vaccine (1 of 1 - PPSV23) Never done   [employer] Visit (AWV)  04/21/2021    Potassium monitoring  05/22/2021    Creatinine monitoring  05/22/2021    Breast cancer screen  09/21/2022    Lipid screen  05/22/2025    Colon cancer screen colonoscopy  05/27/2025    DEXA (modify frequency per FRAX score)  Completed    Flu vaccine  Completed    Shingles Vaccine  Completed    COVID-19 Vaccine  Completed    Hepatitis C screen  Completed    Hepatitis A vaccine  Aged Out    Hepatitis B vaccine  Aged Out    Hib vaccine  Aged Out    Meningococcal (ACWY) vaccine  Aged Out     Recommendations for [employer] Due: see orders and patient instructions/AVS.  . Recommended screening schedule for the next 5-10 years is provided to the patient in written form: see Patient Nerissa Day was seen today for medicare awv.     Diagnoses and all orders for this visit:    Routine general medical examination at a health care facility    Essential hypertension  -     Basic Metabolic Panel; Future    Osteoporosis without current pathological fracture, unspecified osteoporosis type  -     Basic Metabolic Panel; Future  -     Vitamin D 25 Hydroxy; Future            Encouraged healthy diet - gain weight as BMI 18.19. Updated living will in chart. Designated [facility]. decision maker in chart. Recommended that she get Pneumonia vaccine and TDap. Not interested in doing today. Did not address chronic issues other than write orders for future. Will be getting labs prior getting Prolia in 6/2021 - GYN orders Prolia. Will set up regular follow up visit in 6 months and wellness visit in one year. Advance Care Planning   Advanced Care Planning: Discussed the patients choices for care and treatment in case of a health event that adversely affects decision-making abilities. Also discussed the patients long-term treatment options. Reviewed with the patient the [address] [place] [address] Declaration forms  Reviewed the process of designating a competent adult as an Agent (or -in-fact) that could take make health care decisions for the patient if incompetent. Patient was asked to complete the declaration forms, either acknowledge the forms by a public notary or an eligible witness and provide a signed copy to the practice office. Time spent (minutes): 15 minutes    Carlyn Castro MD    . Jessica Galo  INTERNAL MEDICINE  [address]  Dept: [phone]  Dept Fax: 75 : [phone]

## 2021-06-11 ENCOUNTER — NURSE ONLY (OUTPATIENT)
Dept: LAB | Age: 71
End: 2021-06-11

## 2021-06-11 DIAGNOSIS — M81.0 OSTEOPOROSIS WITHOUT CURRENT PATHOLOGICAL FRACTURE, UNSPECIFIED OSTEOPOROSIS TYPE: ICD-10-CM

## 2021-06-11 DIAGNOSIS — I10 ESSENTIAL HYPERTENSION: ICD-10-CM

## 2021-06-11 LAB
ANION GAP SERPL CALCULATED.3IONS-SCNC: 8 MEQ/L (ref 8–16)
BUN BLDV-MCNC: 14 MG/DL (ref 7–22)
CALCIUM SERPL-MCNC: 10.4 MG/DL (ref 8.5–10.5)
CHLORIDE BLD-SCNC: 102 MEQ/L (ref 98–111)
CO2: 28 MEQ/L (ref 23–33)
CREAT SERPL-MCNC: 0.7 MG/DL (ref 0.4–1.2)
GFR SERPL CREATININE-BSD FRML MDRD: 82 ML/MIN/1.73M2
GLUCOSE BLD-MCNC: 81 MG/DL (ref 70–108)
POTASSIUM SERPL-SCNC: 4 MEQ/L (ref 3.5–5.2)
SODIUM BLD-SCNC: 138 MEQ/L (ref 135–145)
VITAMIN D 25-HYDROXY: 70 NG/ML (ref 30–100)

## 2021-06-14 ENCOUNTER — TELEPHONE (OUTPATIENT)
Dept: INTERNAL MEDICINE CLINIC | Age: 71
End: 2021-06-14

## 2021-07-22 ENCOUNTER — TELEPHONE (OUTPATIENT)
Dept: INTERNAL MEDICINE CLINIC | Age: 71
End: 2021-07-22

## 2021-07-22 NOTE — TELEPHONE ENCOUNTER
Patient called stating that she sees Margie Chandler for GYN and Rocio Chavez wanted to see if it is okay for [facility] to have Prolia . Patient states that she had a slight change in kidney function . Please advise .

## 2021-08-02 NOTE — TELEPHONE ENCOUNTER
The last BMP I have on chart is 6/2021 and looks good, better than a year ago even. Has she had more recent labs? Please obtain so I can review. If BMP 6/2021, eGFR 81, is her latest lab - she can have Prolia.

## 2021-08-03 NOTE — TELEPHONE ENCOUNTER
Patient verified that her last BMP was the 6/2021 . Instructed patient that it is okaty to go ahead with the Prolia .

## 2021-08-04 RX ORDER — DIPHENHYDRAMINE HYDROCHLORIDE 50 MG/ML
50 INJECTION INTRAMUSCULAR; INTRAVENOUS ONCE
Status: CANCELLED | OUTPATIENT
Start: 2021-08-06 | End: 2021-08-06

## 2021-08-04 RX ORDER — METHYLPREDNISOLONE SODIUM SUCCINATE 125 MG/2ML
125 INJECTION, POWDER, LYOPHILIZED, FOR SOLUTION INTRAMUSCULAR; INTRAVENOUS ONCE
Status: CANCELLED | OUTPATIENT
Start: 2021-08-06 | End: 2021-08-06

## 2021-08-04 RX ORDER — SODIUM CHLORIDE 9 MG/ML
INJECTION, SOLUTION INTRAVENOUS CONTINUOUS
Status: CANCELLED | OUTPATIENT
Start: 2021-08-06

## 2021-08-04 RX ORDER — EPINEPHRINE 1 MG/ML
0.3 INJECTION, SOLUTION, CONCENTRATE INTRAVENOUS PRN
Status: CANCELLED | OUTPATIENT
Start: 2021-08-06

## 2021-08-17 ENCOUNTER — HOSPITAL ENCOUNTER (OUTPATIENT)
Dept: NURSING | Age: 71
Discharge: HOME | End: 2021-08-17
Payer: MEDICARE

## 2021-08-17 VITALS
RESPIRATION RATE: 16 BRPM | OXYGEN SATURATION: 98 % | DIASTOLIC BLOOD PRESSURE: 60 MMHG | HEART RATE: 65 BPM | TEMPERATURE: 97 F | SYSTOLIC BLOOD PRESSURE: 133 MMHG

## 2021-08-17 DIAGNOSIS — M81.0 AGE-RELATED OSTEOPOROSIS WITHOUT CURRENT PATHOLOGICAL FRACTURE: Primary | ICD-10-CM

## 2021-08-17 PROCEDURE — 96372 THER/PROPH/DIAG INJ SC/IM: CPT

## 2021-08-17 PROCEDURE — 6360000002 HC RX W HCPCS: Performed by: NURSE PRACTITIONER

## 2021-08-17 RX ORDER — DIPHENHYDRAMINE HYDROCHLORIDE 50 MG/ML
50 INJECTION INTRAMUSCULAR; INTRAVENOUS ONCE
Status: CANCELLED | OUTPATIENT
Start: 2022-02-15 | End: 2022-02-15

## 2021-08-17 RX ORDER — SODIUM CHLORIDE 9 MG/ML
INJECTION, SOLUTION INTRAVENOUS CONTINUOUS
Status: CANCELLED | OUTPATIENT
Start: 2022-02-15

## 2021-08-17 RX ORDER — METHYLPREDNISOLONE SODIUM SUCCINATE 125 MG/2ML
125 INJECTION, POWDER, LYOPHILIZED, FOR SOLUTION INTRAMUSCULAR; INTRAVENOUS ONCE
Status: CANCELLED | OUTPATIENT
Start: 2022-02-15 | End: 2022-02-15

## 2021-08-17 RX ADMIN — DENOSUMAB 60 MG: 60 INJECTION SUBCUTANEOUS at 12:17

## 2021-08-17 NOTE — PROGRESS NOTES
1200: Patient arrived ambulatory for Prolia injection. PT RIGHTS AND RESPONSIBILITIES OFFERED TO PT.    1217: Prolia injection administered in left arm. Patient tolerated well. No concerns voiced. AVS reviewed with patient, voiced understanding. Patient discharged ambulatory.                            _m___ Safety:       (Environmental)   [place] to environment   Ensure ID band is correct and in place/ allergy band as needed   Assess for fall risk   Initiate fall precautions as applicable (fall band, side rails, etc.)   Call light within reach   Bed in low position/ wheels locked    _m___ Pain:        Assess pain level and characteristics   Administer analgesics as ordered   Assess effectiveness of pain management and report to MD as needed    _m___ Knowledge Deficit:   Assess baseline knowledge   Provide teaching at level of understanding   Provide teaching via preferred learning method   Evaluate teaching effectiveness    __m__ Hemodynamic/Respiratory Status:       (Pre and Post Procedure Monitoring)   Assess/Monitor vital signs and LOC   Assess Baseline SpO2 prior to any sedation   Obtain weight/height   Assess vital signs/ LOC until patient meets discharge criteria   Monitor procedure site and notify MD of any issues

## 2021-09-20 ENCOUNTER — TELEPHONE (OUTPATIENT)
Dept: INTERNAL MEDICINE CLINIC | Age: 71
End: 2021-09-20

## 2021-09-20 NOTE — TELEPHONE ENCOUNTER
----- Message from Eloy Ribeiro sent at 9/17/2021  1:01 PM EDT -----  Subject: Message to Provider    QUESTIONS  Information for Provider? Patient has had a headache that started today. She is requesting a prescription to have a COVID-19 test done. Can you   please contact patient with information?  ---------------------------------------------------------------------------  --------------  CALL BACK INFO  What is the best way for the office to contact you? OK to leave message on   voicemail  Preferred Call Back Phone Number? [phone]  ---------------------------------------------------------------------------  --------------  SCRIPT ANSWERS  Relationship to Patient?  Self

## 2021-10-28 ENCOUNTER — OFFICE VISIT (OUTPATIENT)
Dept: INTERNAL MEDICINE CLINIC | Age: 71
End: 2021-10-28
Payer: MEDICARE

## 2021-10-28 VITALS
DIASTOLIC BLOOD PRESSURE: 68 MMHG | HEIGHT: 66 IN | BODY MASS INDEX: 17.97 KG/M2 | SYSTOLIC BLOOD PRESSURE: 120 MMHG | HEART RATE: 72 BPM | TEMPERATURE: 97.9 F | WEIGHT: 111.8 LBS

## 2021-10-28 DIAGNOSIS — I10 ESSENTIAL HYPERTENSION: Primary | ICD-10-CM

## 2021-10-28 DIAGNOSIS — G47.00 INSOMNIA, UNSPECIFIED TYPE: ICD-10-CM

## 2021-10-28 DIAGNOSIS — J30.9 ALLERGIC RHINITIS, UNSPECIFIED SEASONALITY, UNSPECIFIED TRIGGER: ICD-10-CM

## 2021-10-28 PROCEDURE — 93000 ELECTROCARDIOGRAM COMPLETE: CPT | Performed by: INTERNAL MEDICINE

## 2021-10-28 PROCEDURE — 99214 OFFICE O/P EST MOD 30 MIN: CPT | Performed by: INTERNAL MEDICINE

## 2021-10-28 RX ORDER — LISINOPRIL 5 MG/1
TABLET ORAL
Qty: 90 TABLET | Refills: 1 | Status: SHIPPED | OUTPATIENT
Start: 2021-10-28 | End: 2022-05-04 | Stop reason: SDUPTHER

## 2021-10-28 RX ORDER — TRAZODONE HYDROCHLORIDE 50 MG/1
50 TABLET ORAL NIGHTLY
Qty: 30 TABLET | Refills: 0 | Status: SHIPPED | OUTPATIENT
Start: 2021-10-28 | End: 2021-12-01 | Stop reason: SDUPTHER

## 2021-10-28 RX ORDER — MONTELUKAST SODIUM 10 MG/1
10 TABLET ORAL NIGHTLY
Qty: 30 TABLET | Refills: 0 | Status: SHIPPED | OUTPATIENT
Start: 2021-10-28 | End: 2021-12-01 | Stop reason: SDUPTHER

## 2021-10-28 NOTE — PROGRESS NOTES
1950    Chief Complaint   Patient presents with    Hypertension     6 months     Osteoporosis       Pt is a 70 y.o. female who presents for 8 month follow up. SI joint pain intermittently - she had x-ray right hip 2016 with mild degenerative changes of right SI joint and given SI join steroid injection at [facility] radiology. She had issues again 9/2018 and right hip x-ray with mild joint space narrowing and sclerosis at right SI joint/lumbar spine x-ray noted mild facet disease L3-S1 and mild levoconvex curvature of lumbar spine. Again was sent back to [facility], Dr. Farheen Glass, for SI joint injection. This January 2021, she started to have right hip - now pain down leg and foot tingles - but intermittent. It is better if standing, worse with sitting and lying down. If takes enough Aleve it will go away but comes back again. She does do yoga. Set up PT and Aleve x 6 weeks - see back in 8 weeks and will see if need to progress to MRI. Sleeps on left side only as unable to sleep on right side due to pain. She completed PT, states pain is mild and tolerable today. She is trying to keep doing exercises. Hypertension - BP controlled on lisinopril. Follow BMP - 6/2021 normal.  No issue with cough, follow potassium and kidney function. Allergic rhinitis - at previous visit suggested she try Xyzal instead of Claritin. Then try Flonase with polysporin. She has had nose bleeds in past with nasal spray but in winter when dryness is any issue. Xyzal affected sleep. Suggested she stay on Allegra as tolerating this well. Fall is a difficult time of year with crops coming off. On Nasocort and Allegra daily. She did allergy testing years ago, and offered allergy injections and she declined. Can try Singulair. Osteoporosis - treated by GYN - Sadie Stacy CNP - on Prolia. Last one in 7/2021. Normal calcium and vitamin D 6/2021.      Poor sleep - she can get to sleep but after 4-5 hours will wake up and be wide awake. She has tried melatonin, and Tylenol PM and did not work. She used Xanax and worked well but worried about it being addictive. Suggested trying Trazodone. She is considering COVID booster but hesitant with rash with last one. Past Medical History:   Diagnosis Date    Allergic rhinitis     Claritin, Flonase prn    Colon polyp     Dr. Krzysztof Bowers - colonoscopy done 5/2015, due in 5-7 years (hyperplastic polyps)    Hemorrhoids     s/p banding    Hypertension     Irritable bowel syndrome     Osteoporosis     on Prolia with GYN - Roselinda Prior.  SI (sacroiliac) joint inflammation (HCC)     s/p SI joint injections - Dr[facility] Robert Cottrell at [facility] due to insurance - 10/2018       Current Outpatient Medications   Medication Sig Dispense Refill    montelukast (SINGULAIR) 10 MG tablet Take 1 tablet by mouth nightly 30 tablet 0    traZODone (DESYREL) 50 MG tablet Take 1 tablet by mouth nightly 30 tablet 0    lisinopril (PRINIVIL;ZESTRIL) 5 MG tablet TAKE 1 TABLET DAILY 90 tablet 1    miconazole nitrate 2 % OINT Apply topically 2 times daily 1 Tube 1    fexofenadine (ALLEGRA ALLERGY) 180 MG tablet Take 180 mg by mouth daily      Triamcinolone Acetonide (NASACORT ALLERGY 24HR NA) by Nasal route      NONFORMULARY Compounded hormones 6x a week.  b complex vitamins capsule Take 1 capsule by mouth daily      Calcium Carb-Cholecalciferol (CALCIUM 600 + D PO) Take 1 tablet by mouth daily      estradiol (ESTRING) 2 MG vaginal ring Place 2 mg vaginally once Follow package directions.  VITAMIN D PO Take 1,000 Units by mouth daily      denosumab (PROLIA) 60 MG/ML SOSY SC injection Inject 60 mg into the skin once       No current facility-administered medications for this visit.        Allergies   Allergen Reactions    Pcn [Penicillins] Other (See Comments)    Chocolate Nausea And Vomiting    Peppermint Oil Nausea And Vomiting     Review of Systems - General ROS: negative for - chills or fever  Psychological ROS: negative for - anxiety and depression  Hematological and Lymphatic ROS: No history of blood clots or bleeding disorder. Respiratory ROS: no cough, shortness of breath, or wheezing  Cardiovascular ROS: no chest pain or dyspnea on exertion, tolerates a flight of stairs, vacuuming  Gastrointestinal ROS: no abdominal pain, change in bowel habits, or black or bloody stools  Genito-Urinary ROS: no dysuria, trouble voiding, or hematuria  Musculoskeletal ROS: negative for - muscle pain or muscular weakness, positive right buttock/leg pain - intermittent  Neurological ROS: negative for - headaches, numbness/tingling, seizures or weakness  Dermatological ROS: negative for - rash or skin lesion changes - rash with second COVID vaccine. Blood pressure 120/68, pulse 72, temperature 97.9 °F (36.6 °C), height 5' 5.98\" (1.676 m), weight 111 lb 12.8 oz (50.7 kg). Physical Examination: General appearance -alert, well appearing, and in no distress  Neck - supple, no significant adenopathy  Chest - clear to auscultation, no wheezes, rales or rhonchi, symmetric air entry  Heart - normal rate, regular rhythm, normal S1, S2, no murmurs, rubs, clicks or gallops  Abdomen -soft, nontender, nondistended  Neurological - alert, oriented, normal speech  Extremities - peripheral pulses normal, no pedal edema, no clubbing or cyanosis  Skin - warm and dry    Diagnostic data:   I have reviewed recent diagnostic testing including labs 6/2021, and DEXA 7/6/21- osteoporosis hips, normal lumbar spine with patient. EKG today. Assessment and Plan:       Diagnosis Orders   1. Essential hypertension  EKG 12 Lead    lisinopril (PRINIVIL;ZESTRIL) 5 MG tablet   2. Allergic rhinitis, unspecified seasonality, unspecified trigger  montelukast (SINGULAIR) 10 MG tablet   3. Insomnia, unspecified type  traZODone (DESYREL) 50 MG tablet      Hypertension - BP controlled, continue lisinopril - EKG unremarkable.     Allergic rhinitis -worsening, continue Allegra and add Singulair. Insomnia - not controlled - add Trazodone and monitor. Follow up in 2 months for physical for trip, follow up of PND, and sleep. Eloisa Oconnell MD    . [facility]  [address]  Dept: [phone]  Dept Fax: [phone] : [phone]

## 2021-12-23 ENCOUNTER — OFFICE VISIT (OUTPATIENT)
Dept: INTERNAL MEDICINE CLINIC | Age: 71
End: 2021-12-23
Payer: MEDICARE

## 2021-12-23 VITALS
HEIGHT: 66 IN | HEART RATE: 80 BPM | BODY MASS INDEX: 17.84 KG/M2 | DIASTOLIC BLOOD PRESSURE: 78 MMHG | WEIGHT: 111 LBS | TEMPERATURE: 97 F | SYSTOLIC BLOOD PRESSURE: 132 MMHG

## 2021-12-23 DIAGNOSIS — T75.3XXA MOTION SICKNESS, INITIAL ENCOUNTER: Primary | ICD-10-CM

## 2021-12-23 DIAGNOSIS — J30.9 ALLERGIC RHINITIS, UNSPECIFIED SEASONALITY, UNSPECIFIED TRIGGER: ICD-10-CM

## 2021-12-23 DIAGNOSIS — G47.00 INSOMNIA, UNSPECIFIED TYPE: ICD-10-CM

## 2021-12-23 PROCEDURE — 99213 OFFICE O/P EST LOW 20 MIN: CPT | Performed by: STUDENT IN AN ORGANIZED HEALTH CARE EDUCATION/TRAINING PROGRAM

## 2021-12-23 RX ORDER — SCOLOPAMINE TRANSDERMAL SYSTEM 1 MG/1
1 PATCH, EXTENDED RELEASE TRANSDERMAL
Qty: 2 PATCH | Refills: 0 | Status: SHIPPED | OUTPATIENT
Start: 2021-12-23 | End: 2021-12-29

## 2021-12-23 ASSESSMENT — ENCOUNTER SYMPTOMS
ABDOMINAL PAIN: 0
COUGH: 0
SHORTNESS OF BREATH: 0
SORE THROAT: 0
VOMITING: 0
WHEEZING: 0
STRIDOR: 0
DIARRHEA: 0
BACK PAIN: 0
CONSTIPATION: 0
NAUSEA: 0

## 2021-12-23 NOTE — PROGRESS NOTES
Terry Hendricks (:  1950) is a 70 y.o. female,Established patient, here for evaluation of the following chief complaint(s):  Hypertension and Other (physical form for cruise)         ASSESSMENT/PLAN:  1. Motion sickness, initial encounter  2. Allergic rhinitis, unspecified seasonality, unspecified trigger  3. Insomnia, unspecified type    - 2 scopolamine patches for cruise   - Completed form for cruise   - Continue singular and allegra   - Continue trazodone     Return in about 6 months (around 2022). Subjective   SUBJECTIVE/OBJECTIVE:  HPI    Patient is here today to get a form filled out for a cruise to [facility] she is taking on 2022. Patient has to go through [facility] to get to [facility]. Recently she sustained a nondisplaced fracture of her left 5th metatarsal when turning her foot on uneven ground on . She is seeing Dr. Winnie Purvis for this who has placed her in a boot. She is not having surgery and is directed to be non-weight bearing. She is hoping the fracture will be healed by 6 weeks. She reports she has been sleeping 5 hours straight on the trazodone. She has also been having less nasal drainage and more stuffiness after taking Singulair. She will continue to take both medications for right now and see how she does. She has also inquired about motion sickness prophylaxis for her trip. Review of Systems   Constitutional: Negative for chills, diaphoresis, fatigue and fever. HENT: Negative for sneezing and sore throat. Respiratory: Negative for cough, shortness of breath, wheezing and stridor. Cardiovascular: Negative for chest pain, palpitations and leg swelling. Gastrointestinal: Negative for abdominal pain, constipation, diarrhea, nausea and vomiting. Endocrine: Negative for polydipsia and polyuria. Genitourinary: Negative for dysuria, flank pain, hematuria and urgency. Musculoskeletal: Negative for back pain and myalgias.    Neurological: Negative for dizziness, syncope, weakness, light-headedness and headaches. Objective   Physical Exam  Constitutional:       Appearance: Normal appearance. She is normal weight. HENT:      Head: Normocephalic and atraumatic. Right Ear: External ear normal.      Left Ear: External ear normal.      Nose: Nose normal.      Mouth/Throat:      Mouth: Mucous membranes are moist.      Pharynx: Oropharynx is clear. Eyes:      Extraocular Movements: Extraocular movements intact. Conjunctiva/sclera: Conjunctivae normal.      Pupils: Pupils are equal, round, and reactive to light. Cardiovascular:      Rate and Rhythm: Normal rate and regular rhythm. Pulses: Normal pulses. Heart sounds: Normal heart sounds. No murmur heard. No friction rub. No gallop. Pulmonary:      Effort: Pulmonary effort is normal.      Breath sounds: Normal breath sounds. No wheezing, rhonchi or rales. Abdominal:      General: Abdomen is flat. Bowel sounds are normal. There is no distension. Palpations: Abdomen is soft. Tenderness: There is no abdominal tenderness. There is no guarding or rebound. Musculoskeletal:         General: Normal range of motion. Cervical back: Normal range of motion and neck supple. Comments: Broken left 5th metatarsal, in a boot and using rolling scooter   Skin:     General: Skin is warm and dry. Capillary Refill: Capillary refill takes less than 2 seconds. Neurological:      General: No focal deficit present. Mental Status: She is alert and oriented to person, place, and time. Mental status is at baseline. Psychiatric:         Mood and Affect: Mood normal.         Behavior: Behavior normal.         Thought Content:  Thought content normal.         Judgment: Judgment normal.            On this date 12/23/2021 I have spent 60 minutes reviewing previous notes, test results and face to face with the patient discussing the diagnosis and importance of compliance with the treatment plan as well as documenting on the day of the visit. This case was discussed and reviewed by Dr. Ilana Pandya. An electronic signature was used to authenticate this note.     --Tanja Grier, DO

## 2022-02-18 ENCOUNTER — HOSPITAL ENCOUNTER (OUTPATIENT)
Dept: NURSING | Age: 72
Discharge: HOME | End: 2022-02-18
Payer: MEDICARE

## 2022-02-18 VITALS
TEMPERATURE: 97.7 F | RESPIRATION RATE: 18 BRPM | OXYGEN SATURATION: 99 % | HEART RATE: 61 BPM | DIASTOLIC BLOOD PRESSURE: 63 MMHG | SYSTOLIC BLOOD PRESSURE: 148 MMHG

## 2022-02-18 DIAGNOSIS — M81.0 AGE-RELATED OSTEOPOROSIS WITHOUT CURRENT PATHOLOGICAL FRACTURE: Primary | ICD-10-CM

## 2022-02-18 PROCEDURE — 96372 THER/PROPH/DIAG INJ SC/IM: CPT

## 2022-02-18 PROCEDURE — 6360000002 HC RX W HCPCS: Performed by: NURSE PRACTITIONER

## 2022-02-18 RX ORDER — METHYLPREDNISOLONE SODIUM SUCCINATE 125 MG/2ML
125 INJECTION, POWDER, LYOPHILIZED, FOR SOLUTION INTRAMUSCULAR; INTRAVENOUS ONCE
Status: CANCELLED | OUTPATIENT
Start: 2022-08-19 | End: 2022-08-19

## 2022-02-18 RX ORDER — SODIUM CHLORIDE 9 MG/ML
INJECTION, SOLUTION INTRAVENOUS CONTINUOUS
Status: CANCELLED | OUTPATIENT
Start: 2022-08-19

## 2022-02-18 RX ORDER — DIPHENHYDRAMINE HYDROCHLORIDE 50 MG/ML
50 INJECTION INTRAMUSCULAR; INTRAVENOUS ONCE
Status: CANCELLED | OUTPATIENT
Start: 2022-08-19 | End: 2022-08-19

## 2022-02-18 RX ADMIN — DENOSUMAB 60 MG: 60 INJECTION SUBCUTANEOUS at 13:11

## 2022-02-18 NOTE — PROGRESS NOTES
1305 pt arrives ambulatory for prolia injection. Injection explained and questions answered. PT RIGHTS AND RESPONSIBILITIES OFFERED TO PT.  1311 injection given. Pt tolerated it well with no complaints. Pt discharged ambulatory with instructions with no complaints.                _m___ Safety:       (Environmental)   [place] to environment   Ensure ID band is correct and in place/ allergy band as needed   Assess for fall risk   Initiate fall precautions as applicable (fall band, side rails, etc.)   Call light within reach   Bed in low position/ wheels locked    _m___ Pain:        Assess pain level and characteristics   Administer analgesics as ordered   Assess effectiveness of pain management and report to MD as needed    _m___ Knowledge Deficit:   Assess baseline knowledge   Provide teaching at level of understanding   Provide teaching via preferred learning method   Evaluate teaching effectiveness    __m__ Hemodynamic/Respiratory Status:       (Pre and Post Procedure Monitoring)   Assess/Monitor vital signs and LOC   Assess Baseline SpO2 prior to any sedation   Obtain weight/height   Assess vital signs/ LOC until patient meets discharge criteria   Monitor procedure site and notify MD of any issues

## 2022-02-21 DIAGNOSIS — G47.00 INSOMNIA, UNSPECIFIED TYPE: ICD-10-CM

## 2022-02-25 DIAGNOSIS — G47.00 INSOMNIA, UNSPECIFIED TYPE: ICD-10-CM

## 2022-02-28 RX ORDER — TRAZODONE HYDROCHLORIDE 50 MG/1
50 TABLET ORAL NIGHTLY
Qty: 90 TABLET | Refills: 0 | Status: SHIPPED | OUTPATIENT
Start: 2022-02-28 | End: 2022-06-15

## 2022-03-01 RX ORDER — TRAZODONE HYDROCHLORIDE 50 MG/1
TABLET ORAL
Qty: 90 TABLET | Refills: 3 | OUTPATIENT
Start: 2022-03-01

## 2022-05-03 DIAGNOSIS — I10 ESSENTIAL HYPERTENSION: ICD-10-CM

## 2022-05-04 RX ORDER — LISINOPRIL 5 MG/1
TABLET ORAL
Qty: 90 TABLET | Refills: 0 | Status: SHIPPED | OUTPATIENT
Start: 2022-05-04 | End: 2022-06-23 | Stop reason: SDUPTHER

## 2022-06-13 DIAGNOSIS — G47.00 INSOMNIA, UNSPECIFIED TYPE: ICD-10-CM

## 2022-06-16 RX ORDER — TRAZODONE HYDROCHLORIDE 50 MG/1
TABLET ORAL
Qty: 90 TABLET | Refills: 0 | Status: SHIPPED | OUTPATIENT
Start: 2022-06-16 | End: 2022-06-23 | Stop reason: SDUPTHER

## 2022-06-23 ENCOUNTER — NURSE ONLY (OUTPATIENT)
Dept: LAB | Age: 72
End: 2022-06-23

## 2022-06-23 ENCOUNTER — OFFICE VISIT (OUTPATIENT)
Dept: INTERNAL MEDICINE CLINIC | Age: 72
End: 2022-06-23
Payer: MEDICARE

## 2022-06-23 VITALS
HEART RATE: 68 BPM | BODY MASS INDEX: 17.81 KG/M2 | TEMPERATURE: 98.5 F | WEIGHT: 110.8 LBS | DIASTOLIC BLOOD PRESSURE: 82 MMHG | SYSTOLIC BLOOD PRESSURE: 128 MMHG | HEIGHT: 66 IN

## 2022-06-23 DIAGNOSIS — I10 ESSENTIAL HYPERTENSION: ICD-10-CM

## 2022-06-23 DIAGNOSIS — M81.0 OSTEOPOROSIS WITHOUT CURRENT PATHOLOGICAL FRACTURE, UNSPECIFIED OSTEOPOROSIS TYPE: ICD-10-CM

## 2022-06-23 DIAGNOSIS — I10 ESSENTIAL HYPERTENSION: Primary | ICD-10-CM

## 2022-06-23 DIAGNOSIS — G47.00 INSOMNIA, UNSPECIFIED TYPE: ICD-10-CM

## 2022-06-23 DIAGNOSIS — J30.9 ALLERGIC RHINITIS, UNSPECIFIED SEASONALITY, UNSPECIFIED TRIGGER: ICD-10-CM

## 2022-06-23 LAB
ANION GAP SERPL CALCULATED.3IONS-SCNC: 8 MEQ/L (ref 8–16)
BASOPHILS # BLD: 0.9 %
BASOPHILS ABSOLUTE: 0.1 THOU/MM3 (ref 0–0.1)
BUN BLDV-MCNC: 16 MG/DL (ref 7–22)
CALCIUM SERPL-MCNC: 10.4 MG/DL (ref 8.5–10.5)
CHLORIDE BLD-SCNC: 99 MEQ/L (ref 98–111)
CO2: 28 MEQ/L (ref 23–33)
CREAT SERPL-MCNC: 0.8 MG/DL (ref 0.4–1.2)
EOSINOPHIL # BLD: 1.9 %
EOSINOPHILS ABSOLUTE: 0.1 THOU/MM3 (ref 0–0.4)
ERYTHROCYTE [DISTWIDTH] IN BLOOD BY AUTOMATED COUNT: 14 % (ref 11.5–14.5)
ERYTHROCYTE [DISTWIDTH] IN BLOOD BY AUTOMATED COUNT: 53.1 FL (ref 35–45)
GFR SERPL CREATININE-BSD FRML MDRD: 70 ML/MIN/1.73M2
GLUCOSE BLD-MCNC: 91 MG/DL (ref 70–108)
HCT VFR BLD CALC: 37.9 % (ref 37–47)
HEMOGLOBIN: 12.3 GM/DL (ref 12–16)
IMMATURE GRANS (ABS): 0.02 THOU/MM3 (ref 0–0.07)
IMMATURE GRANULOCYTES: 0.3 %
LYMPHOCYTES # BLD: 25.7 %
LYMPHOCYTES ABSOLUTE: 1.5 THOU/MM3 (ref 1–4.8)
MCH RBC QN AUTO: 33.5 PG (ref 26–33)
MCHC RBC AUTO-ENTMCNC: 32.5 GM/DL (ref 32.2–35.5)
MCV RBC AUTO: 103.3 FL (ref 81–99)
MONOCYTES # BLD: 8.7 %
MONOCYTES ABSOLUTE: 0.5 THOU/MM3 (ref 0.4–1.3)
NUCLEATED RED BLOOD CELLS: 0 /100 WBC
PLATELET # BLD: 215 THOU/MM3 (ref 130–400)
PMV BLD AUTO: 10 FL (ref 9.4–12.4)
POTASSIUM SERPL-SCNC: 4.5 MEQ/L (ref 3.5–5.2)
RBC # BLD: 3.67 MILL/MM3 (ref 4.2–5.4)
SEG NEUTROPHILS: 62.5 %
SEGMENTED NEUTROPHILS ABSOLUTE COUNT: 3.6 THOU/MM3 (ref 1.8–7.7)
SODIUM BLD-SCNC: 135 MEQ/L (ref 135–145)
VITAMIN D 25-HYDROXY: > 120 NG/ML (ref 30–100)
WBC # BLD: 5.7 THOU/MM3 (ref 4.8–10.8)

## 2022-06-23 PROCEDURE — 99214 OFFICE O/P EST MOD 30 MIN: CPT | Performed by: INTERNAL MEDICINE

## 2022-06-23 PROCEDURE — 1123F ACP DISCUSS/DSCN MKR DOCD: CPT | Performed by: INTERNAL MEDICINE

## 2022-06-23 RX ORDER — TRAZODONE HYDROCHLORIDE 50 MG/1
TABLET ORAL
Qty: 90 TABLET | Refills: 1 | Status: SHIPPED | OUTPATIENT
Start: 2022-06-23

## 2022-06-23 RX ORDER — LISINOPRIL 5 MG/1
TABLET ORAL
Qty: 90 TABLET | Refills: 1 | Status: SHIPPED | OUTPATIENT
Start: 2022-06-23

## 2022-06-23 SDOH — ECONOMIC STABILITY: FOOD INSECURITY: WITHIN THE PAST 12 MONTHS, THE FOOD YOU BOUGHT JUST DIDN'T LAST AND YOU DIDN'T HAVE MONEY TO GET MORE.: NEVER TRUE

## 2022-06-23 SDOH — ECONOMIC STABILITY: FOOD INSECURITY: WITHIN THE PAST 12 MONTHS, YOU WORRIED THAT YOUR FOOD WOULD RUN OUT BEFORE YOU GOT MONEY TO BUY MORE.: NEVER TRUE

## 2022-06-23 ASSESSMENT — PATIENT HEALTH QUESTIONNAIRE - PHQ9
SUM OF ALL RESPONSES TO PHQ9 QUESTIONS 1 & 2: 0
SUM OF ALL RESPONSES TO PHQ QUESTIONS 1-9: 0
2. FEELING DOWN, DEPRESSED OR HOPELESS: 0
SUM OF ALL RESPONSES TO PHQ QUESTIONS 1-9: 0
SUM OF ALL RESPONSES TO PHQ QUESTIONS 1-9: 0
1. LITTLE INTEREST OR PLEASURE IN DOING THINGS: 0
SUM OF ALL RESPONSES TO PHQ QUESTIONS 1-9: 0

## 2022-06-23 ASSESSMENT — SOCIAL DETERMINANTS OF HEALTH (SDOH): HOW HARD IS IT FOR YOU TO PAY FOR THE VERY BASICS LIKE FOOD, HOUSING, MEDICAL CARE, AND HEATING?: NOT HARD AT ALL

## 2022-06-23 NOTE — PROGRESS NOTES
1950    Chief Complaint   Patient presents with    Hypertension     6 months     Allergic Rhinitis     Insomnia       Pt is a 67 y.o. female who presents for 6 month follow up. She didn't get to [place] due to partner being exposed to [employer] 3 days prior to trip - she is scheduled again in 1/2023. She rolled ankle - 5th MT fx 12/6/22 - seeing Dr. Colin Madison and healing on it's own. No surgery. She is on zulma dose of vitamin D (special supplement). He last vitamin D was 70 6/2021 - due for level now after being on supplement 6 months - will need to make sure not getting to high - to get labs this week. SI joint pain intermittently - she had x-ray right hip 2016 with mild degenerative changes of right SI joint and given SI join steroid injection at [facility] radiology. She had issues again 9/2018 and right hip x-ray with mild joint space narrowing and sclerosis at right SI joint/lumbar spine x-ray noted mild facet disease L3-S1 and mild levoconvex curvature of lumbar spine. Again was sent back to [facility], Dr. Jose Antonio Sung, for SI joint injection. This January 2021, she started to have right hip - now pain down leg and foot tingles - but intermittent. It is better if standing, worse with sitting and lying down. If takes enough Aleve it will go away but comes back again. She does do yoga. Set up PT and Aleve x 6 weeks - see back in 8 weeks and will see if need to progress to MRI. Sleeps on left side only as unable to sleep on right side due to pain. She completed PT, states pain is mild and tolerable today. She is trying to keep doing exercises. Takes Aleve for short course with flairs. Hypertension - BP controlled on lisinopril. Follow BMP - 6/2021 normal.  No issue with cough, follow potassium and kidney function. BMP due now. Allergic rhinitis - at previous visit suggested she try Xyzal instead of Claritin. Then try Flonase with polysporin.   She has had nose bleeds in past with nasal spray but in winter when dryness is any issue. Xyzal affected sleep. Suggested she stay on Allegra as tolerating this well. Fall is a difficult time of year with crops coming off. On Nasocort and Allegra daily. She did allergy testing years ago, and offered allergy injections and she declined. Can try Singulair. She had headaches on Singulair and stopped. Currently in a bad season - take 1/2 tab and see if still has headaches. Could change Allegra to Xyzal or Zyrtec and take at night. Osteoporosis - treated by GYN - Dawit Gomez CNP - on Prolia. Next one due 8/2022. Normal calcium and vitamin D 6/2021. Repeat labs now. Poor sleep - she can get to sleep but after 4-5 hours will wake up and be wide awake. She has tried melatonin, and Tylenol PM and did not work. She used Xanax and worked well but worried about it being addictive. Suggested trying Trazodone. She is taking Trazodone and sleeping well 6-7 but won't go higher as takes 15 min to wake up in am.      She is considering COVID booster but hesitant with rash with last one. She got a rash/chills for a week even worse with the booster - not going to get second booster. Past Medical History:   Diagnosis Date    Allergic rhinitis     Claritin, Flonase prn    Colon polyp     Dr. Gina Reyes - colonoscopy done 5/2015, due in 5-7 years (hyperplastic polyps)    Hemorrhoids     s/p banding    Hypertension     Irritable bowel syndrome     Osteoporosis     on Prolia with GYN - Linh Rodas.  SI (sacroiliac) joint inflammation (HCC)     s/p SI joint injections - Dr. Ana Luisa Cheema at [facility] due to insurance - 10/2018       Current Outpatient Medications   Medication Sig Dispense Refill    traZODone (DESYREL) 50 MG tablet TAKE 1 TABLET NIGHTLY 90 tablet 1    lisinopril (PRINIVIL;ZESTRIL) 5 MG tablet Take one tablet by mouth daily 90 tablet 1    UNABLE TO FIND EB S4--calcium citrate ,, mag, vit d  zinc   1200 mg, 500 mg, 10,000 iu, 50 mg.     Three times daily-per dr Erica Starks      miconazole nitrate 2 % OINT Apply topically 2 times daily 1 Tube 1    fexofenadine (ALLEGRA ALLERGY) 180 MG tablet Take 180 mg by mouth daily      Triamcinolone Acetonide (NASACORT ALLERGY 24HR NA) by Nasal route      NONFORMULARY Compounded hormones 6x a week.  estradiol (ESTRING) 2 MG vaginal ring Place 2 mg vaginally once Follow package directions.  denosumab (PROLIA) 60 MG/ML SOSY SC injection Inject 60 mg into the skin once       No current facility-administered medications for this visit. Allergies   Allergen Reactions    Pcn [Penicillins] Other (See Comments)    Chocolate Nausea And Vomiting    Peppermint Oil Nausea And Vomiting     Review of Systems - General ROS: negative for - chills or fever  Psychological ROS: negative for - anxiety and depression  Hematological and Lymphatic ROS: No history of blood clots or bleeding disorder. Respiratory ROS: no cough, shortness of breath, or wheezing  Cardiovascular ROS: no chest pain or dyspnea on exertion, tolerates a flight of stairs, vacuuming  Gastrointestinal ROS: no abdominal pain, change in bowel habits, or black or bloody stools  Genito-Urinary ROS: no dysuria, trouble voiding, or hematuria  Musculoskeletal ROS: negative for - muscle pain or muscular weakness, positive right buttock/leg pain - intermittent  Neurological ROS: negative for - headaches, numbness/tingling, seizures or weakness  Dermatological ROS: negative for - rash or skin lesion changes - rash with second COVID vaccine/and booster. Blood pressure 128/82, pulse 68, temperature 98.5 °F (36.9 °C), height 5' 5.98\" (1.676 m), weight 110 lb 12.8 oz (50.3 kg).     Physical Examination: General appearance -alert, well appearing, and in no distress  Neck - supple, no significant adenopathy  Chest - clear to auscultation, no wheezes, rales or rhonchi, symmetric air entry  Heart - normal rate, regular rhythm, normal S1, S2, no murmurs, rubs, clicks or gallops  Abdomen -soft, nontender, nondistended  Neurological - alert, oriented, normal speech  Extremities - peripheral pulses normal, no pedal edema, no clubbing or cyanosis  Skin - warm and dry    Diagnostic data:  Mammogram normal 5/26/22. Assessment and Plan:     Diagnosis Orders   1. Essential hypertension  lisinopril (PRINIVIL;ZESTRIL) 5 MG tablet    Basic Metabolic Panel    CBC with Auto Differential   2. Allergic rhinitis, unspecified seasonality, unspecified trigger     3. Insomnia, unspecified type  traZODone (DESYREL) 50 MG tablet   4. Osteoporosis without current pathological fracture, unspecified osteoporosis type  Basic Metabolic Panel    Vitamin D 25 Hydroxy      Hypertension - BP controlled, on lisinopril - will continue. Allergic rhinitis -worsening, continue Allegra and try 1/2 tab of Singulair - monitor headache. Insomnia - controlled on Trazodone will continue. Osteoporosis - on Prolia with GYN, will check BMP and vitamin D level - worry on high levels of vitamin D currently. Follow up in 5 months. Labs due now. Rosalie Herrera MD    . [facility]  [address]  Dept: [phone]  Dept Fax: [phone] : [phone]

## 2022-06-23 NOTE — PATIENT INSTRUCTIONS
Currently in a bad season - take 1/2 tab of Singulair and see if still has headaches. Could change Allegra to Xyzal or Zyrtec and take at night.

## 2022-06-28 ENCOUNTER — TELEPHONE (OUTPATIENT)
Dept: INTERNAL MEDICINE CLINIC | Age: 72
End: 2022-06-28

## 2022-06-28 NOTE — TELEPHONE ENCOUNTER
----- Message from Sholmo Bennett MD sent at 6/28/2022  1:11 AM EDT -----  Tell her to stop the supplement Dr. Luis Oleary started and any other supplement with vitamin D - vitamin D is >120 and normal is . Repeat vitamin D level in 3 months.

## 2022-06-28 NOTE — TELEPHONE ENCOUNTER
Patient in formed of lab results and instructed to stop the supplement Dr. Steve Ruiz had her on and all supplements with vitamin D . Will recheck vitamin D level in 3 months . Patient verbalized understanding but [facility] contacted her already and is rechecking vitamin D in 6 weeks . Patient states that she will have the result sent to Dr. Joceline Reed .

## 2022-08-09 ENCOUNTER — NURSE ONLY (OUTPATIENT)
Dept: LAB | Age: 72
End: 2022-08-09

## 2022-08-09 LAB — VITAMIN D 25-HYDROXY: 105 NG/ML (ref 30–100)

## 2022-08-23 ENCOUNTER — HOSPITAL ENCOUNTER (OUTPATIENT)
Dept: NURSING | Age: 72
Discharge: HOME | End: 2022-08-23
Payer: MEDICARE

## 2022-08-23 VITALS
RESPIRATION RATE: 16 BRPM | OXYGEN SATURATION: 97 % | HEART RATE: 60 BPM | SYSTOLIC BLOOD PRESSURE: 122 MMHG | TEMPERATURE: 97.7 F | DIASTOLIC BLOOD PRESSURE: 60 MMHG

## 2022-08-23 DIAGNOSIS — M81.0 AGE-RELATED OSTEOPOROSIS WITHOUT CURRENT PATHOLOGICAL FRACTURE: Primary | ICD-10-CM

## 2022-08-23 PROCEDURE — 6360000002 HC RX W HCPCS: Performed by: PHYSICIAN ASSISTANT

## 2022-08-23 PROCEDURE — 96372 THER/PROPH/DIAG INJ SC/IM: CPT

## 2022-08-23 RX ORDER — ACETAMINOPHEN 325 MG/1
650 TABLET ORAL
OUTPATIENT
Start: 2023-02-21

## 2022-08-23 RX ORDER — ONDANSETRON 2 MG/ML
8 INJECTION INTRAMUSCULAR; INTRAVENOUS
OUTPATIENT
Start: 2023-02-21

## 2022-08-23 RX ORDER — SODIUM CHLORIDE 9 MG/ML
INJECTION, SOLUTION INTRAVENOUS CONTINUOUS
OUTPATIENT
Start: 2023-02-21

## 2022-08-23 RX ORDER — ALBUTEROL SULFATE 90 UG/1
4 AEROSOL, METERED RESPIRATORY (INHALATION) PRN
OUTPATIENT
Start: 2022-08-23

## 2022-08-23 RX ORDER — SODIUM CHLORIDE 9 MG/ML
INJECTION, SOLUTION INTRAVENOUS CONTINUOUS
OUTPATIENT
Start: 2022-08-23

## 2022-08-23 RX ORDER — DIPHENHYDRAMINE HYDROCHLORIDE 50 MG/ML
50 INJECTION INTRAMUSCULAR; INTRAVENOUS
OUTPATIENT
Start: 2023-02-21

## 2022-08-23 RX ORDER — ONDANSETRON 2 MG/ML
8 INJECTION INTRAMUSCULAR; INTRAVENOUS
OUTPATIENT
Start: 2022-08-23

## 2022-08-23 RX ORDER — ALBUTEROL SULFATE 90 UG/1
4 AEROSOL, METERED RESPIRATORY (INHALATION) PRN
OUTPATIENT
Start: 2023-02-21

## 2022-08-23 RX ORDER — DIPHENHYDRAMINE HYDROCHLORIDE 50 MG/ML
50 INJECTION INTRAMUSCULAR; INTRAVENOUS
OUTPATIENT
Start: 2022-08-23

## 2022-08-23 RX ORDER — ACETAMINOPHEN 325 MG/1
650 TABLET ORAL
OUTPATIENT
Start: 2022-08-23

## 2022-08-23 RX ADMIN — DENOSUMAB 60 MG: 60 INJECTION SUBCUTANEOUS at 11:42

## 2022-08-23 ASSESSMENT — PAIN - FUNCTIONAL ASSESSMENT: PAIN_FUNCTIONAL_ASSESSMENT: NONE - DENIES PAIN

## 2022-08-23 NOTE — DISCHARGE INSTRUCTIONS
Next appointment is scheduled for February 28 at 11:30    ACTIVITY:  Continue usual care with your doctor. Call your doctor immediately if any severe problems or go to the nearest emergency room. I have been treated and hereby acknowledge receiving this instruction sheet.

## 2022-08-23 NOTE — PROGRESS NOTES
1138 Patient arrived to [facility] ambulatory for prolia injection. Oriented to room and call light  PT RIGHTS AND RESPONSIBILITIES OFFERED TO PT.     1142 Prolia injection given and she denies complaints. Discharge instructions given and explained and she denies questions.   Discharged ambulatory       __M__ Safety:       (Environmental)  [place] to environment  Ensure ID band is correct and in place/ allergy band as needed  Assess for fall risk  Initiate fall precautions as applicable (fall band, side rails, etc.)  Call light within reach  Bed in low position/ wheels locked    __M__ Pain:       Assess pain level and characteristics  Administer analgesics as ordered  Assess effectiveness of pain management and report to MD as needed    _M___ Knowledge Deficit:  Assess baseline knowledge  Provide teaching at level of understanding  Provide teaching via preferred learning method  Evaluate teaching effectiveness    __M__ Hemodynamic/Respiratory Status:       (Pre and Post Procedure Monitoring)  Assess/Monitor vital signs and LOC  Assess Baseline SpO2 prior to any sedation  Obtain weight/height  Assess vital signs/ LOC until patient meets discharge criteria  Monitor procedure site and notify MD of any issues

## 2022-11-21 SDOH — HEALTH STABILITY: PHYSICAL HEALTH: ON AVERAGE, HOW MANY DAYS PER WEEK DO YOU ENGAGE IN MODERATE TO STRENUOUS EXERCISE (LIKE A BRISK WALK)?: 6 DAYS

## 2022-11-21 SDOH — HEALTH STABILITY: PHYSICAL HEALTH: ON AVERAGE, HOW MANY MINUTES DO YOU ENGAGE IN EXERCISE AT THIS LEVEL?: 30 MIN

## 2022-11-21 ASSESSMENT — PATIENT HEALTH QUESTIONNAIRE - PHQ9
2. FEELING DOWN, DEPRESSED OR HOPELESS: 0
1. LITTLE INTEREST OR PLEASURE IN DOING THINGS: 0
SUM OF ALL RESPONSES TO PHQ9 QUESTIONS 1 & 2: 0
SUM OF ALL RESPONSES TO PHQ QUESTIONS 1-9: 0

## 2022-11-21 ASSESSMENT — LIFESTYLE VARIABLES
HOW MANY STANDARD DRINKS CONTAINING ALCOHOL DO YOU HAVE ON A TYPICAL DAY: 1
HOW OFTEN DURING THE LAST YEAR HAVE YOU NEEDED AN ALCOHOLIC DRINK FIRST THING IN THE MORNING TO GET YOURSELF GOING AFTER A NIGHT OF HEAVY DRINKING: NEVER
HAS A RELATIVE, FRIEND, DOCTOR, OR ANOTHER HEALTH PROFESSIONAL EXPRESSED CONCERN ABOUT YOUR DRINKING OR SUGGESTED YOU CUT DOWN: 0
HOW OFTEN DURING THE LAST YEAR HAVE YOU FOUND THAT YOU WERE NOT ABLE TO STOP DRINKING ONCE YOU HAD STARTED: 0
HOW OFTEN DO YOU HAVE A DRINK CONTAINING ALCOHOL: 4 OR MORE TIMES A WEEK
HOW OFTEN DURING THE LAST YEAR HAVE YOU NEEDED AN ALCOHOLIC DRINK FIRST THING IN THE MORNING TO GET YOURSELF GOING AFTER A NIGHT OF HEAVY DRINKING: 0
HAS A RELATIVE, FRIEND, DOCTOR, OR ANOTHER HEALTH PROFESSIONAL EXPRESSED CONCERN ABOUT YOUR DRINKING OR SUGGESTED YOU CUT DOWN: NO
HOW OFTEN DO YOU HAVE A DRINK CONTAINING ALCOHOL: 5
HOW OFTEN DURING THE LAST YEAR HAVE YOU FOUND THAT YOU WERE NOT ABLE TO STOP DRINKING ONCE YOU HAD STARTED: NEVER
HOW OFTEN DURING THE LAST YEAR HAVE YOU BEEN UNABLE TO REMEMBER WHAT HAPPENED THE NIGHT BEFORE BECAUSE YOU HAD BEEN DRINKING: 0
HOW MANY STANDARD DRINKS CONTAINING ALCOHOL DO YOU HAVE ON A TYPICAL DAY: 1 OR 2
HAVE YOU OR SOMEONE ELSE BEEN INJURED AS A RESULT OF YOUR DRINKING: NO
HOW OFTEN DURING THE LAST YEAR HAVE YOU HAD A FEELING OF GUILT OR REMORSE AFTER DRINKING: 0
HOW OFTEN DURING THE LAST YEAR HAVE YOU FAILED TO DO WHAT WAS NORMALLY EXPECTED FROM YOU BECAUSE OF DRINKING: 0
HOW OFTEN DO YOU HAVE SIX OR MORE DRINKS ON ONE OCCASION: 1
HAVE YOU OR SOMEONE ELSE BEEN INJURED AS A RESULT OF YOUR DRINKING: 0
HOW OFTEN DURING THE LAST YEAR HAVE YOU HAD A FEELING OF GUILT OR REMORSE AFTER DRINKING: NEVER
HOW OFTEN DURING THE LAST YEAR HAVE YOU BEEN UNABLE TO REMEMBER WHAT HAPPENED THE NIGHT BEFORE BECAUSE YOU HAD BEEN DRINKING: NEVER
HOW OFTEN DURING THE LAST YEAR HAVE YOU FAILED TO DO WHAT WAS NORMALLY EXPECTED FROM YOU BECAUSE OF DRINKING: NEVER

## 2022-11-28 ENCOUNTER — OFFICE VISIT (OUTPATIENT)
Dept: INTERNAL MEDICINE CLINIC | Age: 72
End: 2022-11-28
Payer: MEDICARE

## 2022-11-28 ENCOUNTER — NURSE ONLY (OUTPATIENT)
Dept: LAB | Age: 72
End: 2022-11-28

## 2022-11-28 VITALS
DIASTOLIC BLOOD PRESSURE: 76 MMHG | BODY MASS INDEX: 17.64 KG/M2 | HEART RATE: 64 BPM | TEMPERATURE: 98 F | HEIGHT: 66 IN | SYSTOLIC BLOOD PRESSURE: 124 MMHG | WEIGHT: 109.8 LBS

## 2022-11-28 DIAGNOSIS — M81.0 OSTEOPOROSIS WITHOUT CURRENT PATHOLOGICAL FRACTURE, UNSPECIFIED OSTEOPOROSIS TYPE: ICD-10-CM

## 2022-11-28 DIAGNOSIS — I10 ESSENTIAL HYPERTENSION: ICD-10-CM

## 2022-11-28 DIAGNOSIS — R63.6 LOW WEIGHT FOR HEIGHT: ICD-10-CM

## 2022-11-28 DIAGNOSIS — Z00.00 PREVENTATIVE HEALTH CARE: Primary | ICD-10-CM

## 2022-11-28 DIAGNOSIS — Z23 NEED FOR PNEUMOCOCCAL VACCINATION: ICD-10-CM

## 2022-11-28 DIAGNOSIS — R71.8 ELEVATED MCV: ICD-10-CM

## 2022-11-28 DIAGNOSIS — G47.00 INSOMNIA, UNSPECIFIED TYPE: ICD-10-CM

## 2022-11-28 LAB
ALBUMIN SERPL-MCNC: 4.6 G/DL (ref 3.5–5.1)
ALP BLD-CCNC: 66 U/L (ref 38–126)
ALT SERPL-CCNC: 9 U/L (ref 11–66)
ANION GAP SERPL CALCULATED.3IONS-SCNC: 11 MEQ/L (ref 8–16)
AST SERPL-CCNC: 22 U/L (ref 5–40)
BASOPHILS # BLD: 1 %
BASOPHILS ABSOLUTE: 0.1 THOU/MM3 (ref 0–0.1)
BILIRUB SERPL-MCNC: 0.4 MG/DL (ref 0.3–1.2)
BUN BLDV-MCNC: 15 MG/DL (ref 7–22)
CALCIUM SERPL-MCNC: 9.8 MG/DL (ref 8.5–10.5)
CHLORIDE BLD-SCNC: 102 MEQ/L (ref 98–111)
CO2: 26 MEQ/L (ref 23–33)
CREAT SERPL-MCNC: 0.7 MG/DL (ref 0.4–1.2)
EOSINOPHIL # BLD: 1.2 %
EOSINOPHILS ABSOLUTE: 0.1 THOU/MM3 (ref 0–0.4)
ERYTHROCYTE [DISTWIDTH] IN BLOOD BY AUTOMATED COUNT: 13.3 % (ref 11.5–14.5)
ERYTHROCYTE [DISTWIDTH] IN BLOOD BY AUTOMATED COUNT: 51.4 FL (ref 35–45)
GFR SERPL CREATININE-BSD FRML MDRD: > 60 ML/MIN/1.73M2
GLUCOSE BLD-MCNC: 99 MG/DL (ref 70–108)
HCT VFR BLD CALC: 38.8 % (ref 37–47)
HEMOGLOBIN: 12.5 GM/DL (ref 12–16)
IMMATURE GRANS (ABS): 0.02 THOU/MM3 (ref 0–0.07)
IMMATURE GRANULOCYTES: 0.4 %
LYMPHOCYTES # BLD: 23.9 %
LYMPHOCYTES ABSOLUTE: 1.2 THOU/MM3 (ref 1–4.8)
MCH RBC QN AUTO: 33.5 PG (ref 26–33)
MCHC RBC AUTO-ENTMCNC: 32.2 GM/DL (ref 32.2–35.5)
MCV RBC AUTO: 104 FL (ref 81–99)
MONOCYTES # BLD: 9.7 %
MONOCYTES ABSOLUTE: 0.5 THOU/MM3 (ref 0.4–1.3)
NUCLEATED RED BLOOD CELLS: 0 /100 WBC
PLATELET # BLD: 215 THOU/MM3 (ref 130–400)
PMV BLD AUTO: 9.7 FL (ref 9.4–12.4)
POTASSIUM SERPL-SCNC: 4.8 MEQ/L (ref 3.5–5.2)
RBC # BLD: 3.73 MILL/MM3 (ref 4.2–5.4)
SEG NEUTROPHILS: 63.8 %
SEGMENTED NEUTROPHILS ABSOLUTE COUNT: 3.3 THOU/MM3 (ref 1.8–7.7)
SODIUM BLD-SCNC: 139 MEQ/L (ref 135–145)
TOTAL PROTEIN: 7 G/DL (ref 6.1–8)
VITAMIN D 25-HYDROXY: 58 NG/ML (ref 30–100)
WBC # BLD: 5.1 THOU/MM3 (ref 4.8–10.8)

## 2022-11-28 PROCEDURE — G0009 ADMIN PNEUMOCOCCAL VACCINE: HCPCS | Performed by: INTERNAL MEDICINE

## 2022-11-28 PROCEDURE — 3078F DIAST BP <80 MM HG: CPT | Performed by: INTERNAL MEDICINE

## 2022-11-28 PROCEDURE — 1123F ACP DISCUSS/DSCN MKR DOCD: CPT | Performed by: INTERNAL MEDICINE

## 2022-11-28 PROCEDURE — 90677 PCV20 VACCINE IM: CPT | Performed by: INTERNAL MEDICINE

## 2022-11-28 PROCEDURE — G0439 PPPS, SUBSEQ VISIT: HCPCS | Performed by: INTERNAL MEDICINE

## 2022-11-28 PROCEDURE — 3074F SYST BP LT 130 MM HG: CPT | Performed by: INTERNAL MEDICINE

## 2022-11-28 RX ORDER — LISINOPRIL 5 MG/1
TABLET ORAL
Qty: 90 TABLET | Refills: 1 | Status: SHIPPED | OUTPATIENT
Start: 2022-11-28

## 2022-11-28 RX ORDER — TRAZODONE HYDROCHLORIDE 50 MG/1
TABLET ORAL
Qty: 90 TABLET | Refills: 1 | Status: SHIPPED | OUTPATIENT
Start: 2022-11-28

## 2022-11-28 NOTE — PROGRESS NOTES
After obtaining consent, and per orders of Dr. Saray Barone, injection of Prevnar 13 given in Left deltoid by Courtney Ramos LPN. Patient instructed to remain in clinic for 20 minutes afterwards, and to report any adverse reaction to me immediately.

## 2022-11-28 NOTE — PATIENT INSTRUCTIONS
greater. Reapply every 2 to 3 hours or after sweating, drying off with a towel, or swimming. Always wear a seat belt when traveling in a car. Always wear a helmet when riding a bicycle or motorcycle.

## 2022-11-28 NOTE — PROGRESS NOTES
Medicare Annual Wellness Visit    Daniel Yost is here for Medicare AWV    Assessment & Plan   Preventative health care  Need for pneumococcal vaccination  -     Pneumococcal, PCV20, PREVNAR 21, (age 25 yrs+), IM, PF  -     [place] ADMIN PNEUMOCOCCAL VACCINE  Insomnia, unspecified type  -     traZODone (DESYREL) 50 MG tablet; TAKE 1 TABLET NIGHTLY, Disp-90 tablet, R-1Normal  Osteoporosis without current pathological fracture, unspecified osteoporosis type  -     Vitamin D 25 Hydroxy; Future  Elevated MCV  -     CBC with Auto Differential; Future  Essential hypertension  -     lisinopril (PRINIVIL;ZESTRIL) 5 MG tablet; Take one tablet by mouth daily, Disp-90 tablet, R-1Normal  -     Comprehensive Metabolic Panel; Future  Low weight for height  -     Comprehensive Metabolic Panel; Future    Recommendations for Preventive Services Due: see orders and patient instructions/AVS.  Recommended screening schedule for the next 5-10 years is provided to the patient in written form: see Patient Instructions/AVS.    Work on weight gain -focus on getting enough protein. Consider meclizine OTC for trip. Will do pneumonia vaccine - Prevnar 20 today. Consider Tdap, and COVID vaccine. She had COVID beginning of 9/2022 so can wait till mid to late December. She does get rash with vaccines so if does not want to get vaccine that is her choice. All other diagnoses are chronic issues for which chronic medications reordered or follow-up monitoring labs ordered. Return in 6 months (on 5/28/2023) for Medicare Annual Wellness Visit in 1 year. Regular follow-up visit in 6 months. Labs due now. Subjective       Patient's complete Health Risk Assessment and screening values have been reviewed and are found in Flowsheets. The following problems were reviewed today and where indicated follow up appointments were made and/or referrals ordered.     Positive Risk Factor Screenings with Interventions:              Health Habits/Nutrition:  Physical Activity: Sufficiently Active    Days of Exercise per Week: 6 days    Minutes of Exercise per Session: 30 min     Have you lost any weight without trying in the past 3 months?: No  Body mass index: (!) 17.73  Have you seen the dentist within the past year?: Yes  Health Habits/Nutrition Interventions:  Nutritional issues:  her weight is stable, encouraged healthy diet and eats multiple small meals a day. Objective   Vitals:    11/28/22 0937   BP: 124/76   Site: Left Upper Arm   Position: Sitting   Cuff Size: Medium Adult   Pulse: 64   Temp: 98 °F (36.7 °C)   Weight: 109 lb 12.8 oz (49.8 kg)   Height: 5' 5.98\" (1.676 m)      Body mass index is 17.73 kg/m². Allergies   Allergen Reactions    Pcn [Penicillins] Other (See Comments)    Chocolate Nausea And Vomiting    Peppermint Oil Nausea And Vomiting     Prior to Visit Medications    Medication Sig Taking? Authorizing Provider   CALCIUM CITRATE PO Take 1,200 mg by mouth daily Yes Historical Provider, MD   traZODone (DESYREL) 50 MG tablet TAKE 1 TABLET NIGHTLY Yes Heather Baird MD   lisinopril (PRINIVIL;ZESTRIL) 5 MG tablet Take one tablet by mouth daily Yes Heather Baird MD   miconazole nitrate 2 % OINT Apply topically 2 times daily Yes Heather Baird MD   fexofenadine (ALLEGRA) 180 MG tablet Take 180 mg by mouth daily Yes Historical Provider, MD   Triamcinolone Acetonide (NASACORT ALLERGY 24HR NA) by Nasal route Yes Historical Provider, MD   NONFORMULARY Compounded hormones 6x a week. Yes Historical Provider, MD   estradiol (ESTRING) 2 MG vaginal ring Place 2 mg vaginally once Follow package directions.  Yes Historical Provider, MD   denosumab (PROLIA) 60 MG/ML SOSY SC injection Inject 60 mg into the skin once Yes Historical Provider, MD   polyethylene glycol (MIRALAX) 17 g PACK packet Take 17 g by mouth daily  Historical Provider, MD Vallada[place] (Including outside providers/suppliers regularly involved in providing care):   Patient Care Team:  Drew Borrero MD as PCP - General (Internal Medicine)  Drew Borrero MD as PCP - [facility] Empaneled Provider     Reviewed and updated this visit:  Tobacco  Allergies  Meds  Problems  Med Hx  Surg Hx  Soc Hx  Fam Hx

## 2022-11-29 ENCOUNTER — TELEPHONE (OUTPATIENT)
Dept: INTERNAL MEDICINE CLINIC | Age: 72
End: 2022-11-29

## 2022-11-29 RX ORDER — POLYETHYLENE GLYCOL 3350 17 G/17G
17 POWDER, FOR SOLUTION ORAL DAILY
COMMUNITY

## 2022-11-29 NOTE — TELEPHONE ENCOUNTER
----- Message from Saman Espitia MD sent at 11/28/2022  6:48 PM EST -----  Please call patient and let them know results were acceptable. Ask how much vitamin D is in her supplement at home and if the dose for that is 3 tabs, so I can tell her how much to take to use those up.

## 2022-11-29 NOTE — TELEPHONE ENCOUNTER
Patient informed that lab results are acceptable . Patient is not taking any vitamin D . Patient is taking Calcium Citrate without vitamin D  for a total of 1200 mg of calcium .

## 2022-12-04 NOTE — TELEPHONE ENCOUNTER
At the last visit she asked me if when her numbers came down, could she use up the supplements Dr. Amberly Alvarez had given her. I was asking how many IU vitamin D was in a dose of that and how many tabs make one dose.

## 2022-12-14 NOTE — TELEPHONE ENCOUNTER
Patient called back and the vitamin D is 3333 IU in each tablet . Dr. Malu Stoll was having her take 3 tabs a day to get enough calcium . Calcium 400 mg , magnesium 166.7 mg , vitamin D 3333 IU and zinc 16.7 mg in each tablet .

## 2023-02-28 ENCOUNTER — HOSPITAL ENCOUNTER (OUTPATIENT)
Dept: NURSING | Age: 73
Discharge: HOME | End: 2023-02-28
Payer: MEDICARE

## 2023-02-28 VITALS
RESPIRATION RATE: 18 BRPM | DIASTOLIC BLOOD PRESSURE: 59 MMHG | OXYGEN SATURATION: 98 % | HEART RATE: 58 BPM | SYSTOLIC BLOOD PRESSURE: 127 MMHG | TEMPERATURE: 98.3 F

## 2023-02-28 DIAGNOSIS — M81.0 AGE-RELATED OSTEOPOROSIS WITHOUT CURRENT PATHOLOGICAL FRACTURE: Primary | ICD-10-CM

## 2023-02-28 PROCEDURE — 96372 THER/PROPH/DIAG INJ SC/IM: CPT

## 2023-02-28 PROCEDURE — 6360000002 HC RX W HCPCS: Performed by: PHYSICIAN ASSISTANT

## 2023-02-28 RX ORDER — DIPHENHYDRAMINE HYDROCHLORIDE 50 MG/ML
50 INJECTION INTRAMUSCULAR; INTRAVENOUS
OUTPATIENT
Start: 2023-08-22

## 2023-02-28 RX ORDER — ALBUTEROL SULFATE 90 UG/1
4 AEROSOL, METERED RESPIRATORY (INHALATION) PRN
OUTPATIENT
Start: 2023-08-22

## 2023-02-28 RX ORDER — SODIUM CHLORIDE 9 MG/ML
INJECTION, SOLUTION INTRAVENOUS CONTINUOUS
OUTPATIENT
Start: 2023-08-22

## 2023-02-28 RX ORDER — ONDANSETRON 2 MG/ML
8 INJECTION INTRAMUSCULAR; INTRAVENOUS
OUTPATIENT
Start: 2023-08-22

## 2023-02-28 RX ORDER — ACETAMINOPHEN 325 MG/1
650 TABLET ORAL
OUTPATIENT
Start: 2023-08-22

## 2023-02-28 RX ADMIN — DENOSUMAB 60 MG: 60 INJECTION SUBCUTANEOUS at 11:46

## 2023-02-28 NOTE — PROGRESS NOTES
1140 pt arrives ambulatory for prolia injection. Injection explained and questions answered. PT RIGHTS AND RESPONSIBILITIES OFFERED TO PT.   1146 injection given. Pt tolerated it well with no complaints. Pt discharged ambulatory with instructions with no complaints.                _m___ Safety:       (Environmental)  [place] to environment  Ensure ID band is correct and in place/ allergy band as needed  Assess for fall risk  Initiate fall precautions as applicable (fall band, side rails, etc.)  Call light within reach  Bed in low position/ wheels locked    __m__ Pain:       Assess pain level and characteristics  Administer analgesics as ordered  Assess effectiveness of pain management and report to MD as needed    __m__ Knowledge Deficit:  Assess baseline knowledge  Provide teaching at level of understanding  Provide teaching via preferred learning method  Evaluate teaching effectiveness    __m__ Hemodynamic/Respiratory Status:       (Pre and Post Procedure Monitoring)  Assess/Monitor vital signs and LOC  Assess Baseline SpO2 prior to any sedation  Obtain weight/height  Assess vital signs/ LOC until patient meets discharge criteria  Monitor procedure site and notify MD of any issues

## 2023-05-30 ENCOUNTER — TELEPHONE (OUTPATIENT)
Dept: INTERNAL MEDICINE CLINIC | Age: 73
End: 2023-05-30

## 2023-05-30 RX ORDER — AMOXICILLIN AND CLAVULANATE POTASSIUM 875; 125 MG/1; MG/1
1 TABLET, FILM COATED ORAL 2 TIMES DAILY
Qty: 14 TABLET | Refills: 0 | Status: CANCELLED | OUTPATIENT
Start: 2023-05-30 | End: 2023-06-06

## 2023-05-30 NOTE — TELEPHONE ENCOUNTER
Pt called in saying she thinks she has a sinus infection. Started on 5/15/23 with what she thought was a cold. Her rt side is plugged most of the time and she has drainage of yellow to green. No fever. She has taken OTC sinus pain and congestion with no relief. Steam sometimes temporarily helps. 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 I offered an appt tomorrow but she will be OOT.

## 2023-05-30 NOTE — TELEPHONE ENCOUNTER
Pt is allergic to pcn and so Dr. Kera Paez would like to order Doxycycline 100 mg. Bid x 7 days. I spoke to Elias Lynne at [place].

## 2023-05-30 NOTE — TELEPHONE ENCOUNTER
Per vo from /Will give antibiotic as pt can not come in for appt. Augmentin 875 mg. 1 tab bid. X 7 days. Pt notified.

## 2023-05-31 RX ORDER — DOXYCYCLINE HYCLATE 100 MG
100 TABLET ORAL 2 TIMES DAILY
Qty: 14 TABLET | Refills: 0 | OUTPATIENT
Start: 2023-05-31 | End: 2023-06-07

## 2023-06-05 ENCOUNTER — NURSE ONLY (OUTPATIENT)
Dept: LAB | Age: 73
End: 2023-06-05

## 2023-06-05 DIAGNOSIS — I10 PRIMARY HYPERTENSION: ICD-10-CM

## 2023-06-05 DIAGNOSIS — J01.00 ACUTE NON-RECURRENT MAXILLARY SINUSITIS: ICD-10-CM

## 2023-06-05 LAB
ANION GAP SERPL CALC-SCNC: 13 MEQ/L (ref 8–16)
BUN SERPL-MCNC: 14 MG/DL (ref 7–22)
CALCIUM SERPL-MCNC: 9.1 MG/DL (ref 8.5–10.5)
CHLORIDE SERPL-SCNC: 99 MEQ/L (ref 98–111)
CO2 SERPL-SCNC: 24 MEQ/L (ref 23–33)
CREAT SERPL-MCNC: 0.7 MG/DL (ref 0.4–1.2)
GFR SERPL CREATININE-BSD FRML MDRD: > 60 ML/MIN/1.73M2
GLUCOSE SERPL-MCNC: 126 MG/DL (ref 70–108)
POTASSIUM SERPL-SCNC: 4.3 MEQ/L (ref 3.5–5.2)
SODIUM SERPL-SCNC: 136 MEQ/L (ref 135–145)

## 2023-06-05 NOTE — TELEPHONE ENCOUNTER
[facility]ed [facility] , spoke with Tabby Mendez . Canceled previous script to mail pharmacy .

## 2023-06-06 RX ORDER — DOXYCYCLINE HYCLATE 100 MG
100 TABLET ORAL 2 TIMES DAILY
Qty: 10 TABLET | Refills: 0 | OUTPATIENT
Start: 2023-06-06 | End: 2023-06-11

## 2023-09-20 RX ORDER — SODIUM CHLORIDE 9 MG/ML
INJECTION, SOLUTION INTRAVENOUS CONTINUOUS
OUTPATIENT
Start: 2023-09-25

## 2023-09-20 RX ORDER — DIPHENHYDRAMINE HYDROCHLORIDE 50 MG/ML
50 INJECTION INTRAMUSCULAR; INTRAVENOUS
OUTPATIENT
Start: 2023-09-25

## 2023-09-20 RX ORDER — EPINEPHRINE 1 MG/ML
0.3 INJECTION, SOLUTION, CONCENTRATE INTRAVENOUS PRN
OUTPATIENT
Start: 2023-09-25

## 2023-10-17 ENCOUNTER — HOSPITAL ENCOUNTER (OUTPATIENT)
Dept: NURSING | Age: 73
Discharge: HOME | End: 2023-10-17
Payer: MEDICARE

## 2023-10-17 VITALS
OXYGEN SATURATION: 100 % | DIASTOLIC BLOOD PRESSURE: 61 MMHG | RESPIRATION RATE: 20 BRPM | SYSTOLIC BLOOD PRESSURE: 126 MMHG | HEART RATE: 58 BPM

## 2023-10-17 DIAGNOSIS — M81.0 AGE-RELATED OSTEOPOROSIS WITHOUT CURRENT PATHOLOGICAL FRACTURE: Primary | ICD-10-CM

## 2023-10-17 PROCEDURE — 96372 THER/PROPH/DIAG INJ SC/IM: CPT

## 2023-10-17 PROCEDURE — 6360000002 HC RX W HCPCS: Performed by: NURSE PRACTITIONER

## 2023-10-17 RX ORDER — DIPHENHYDRAMINE HYDROCHLORIDE 50 MG/ML
50 INJECTION INTRAMUSCULAR; INTRAVENOUS
OUTPATIENT
Start: 2024-04-16

## 2023-10-17 RX ORDER — EPINEPHRINE 1 MG/ML
0.3 INJECTION, SOLUTION INTRAMUSCULAR; SUBCUTANEOUS PRN
OUTPATIENT
Start: 2024-04-16

## 2023-10-17 RX ORDER — SODIUM CHLORIDE 9 MG/ML
INJECTION, SOLUTION INTRAVENOUS CONTINUOUS
OUTPATIENT
Start: 2024-04-16

## 2023-10-17 RX ADMIN — DENOSUMAB 60 MG: 60 INJECTION SUBCUTANEOUS at 11:50

## 2023-10-17 NOTE — PROGRESS NOTES
1143:  ARRIVES AMBULATORY FOR PROLIA INJECTION. PROCESS REVIEWED.   PT STATES SHE HAS NOT HAD ISSUES WITH PREVIOUS INJECTIONS.  1200:  TOLERATED INJECTION WELL AND PT DISCHARGED AMBULATORY WITH INSTRUCTIONS AND NEXT APPT REMINDER.    _M___ Safety:       (Environmental)  [place] to environment  Ensure ID band is correct and in place/ allergy band as needed  Assess for fall risk  Initiate fall precautions as applicable (fall band, side rails, etc.)  Call light within reach  Bed in low position/ wheels locked    _M___ Pain:       Assess pain level and characteristics  Administer analgesics as ordered  Assess effectiveness of pain management and report to MD as needed    __M__ Knowledge Deficit:  Assess baseline knowledge  Provide teaching at level of understanding  Provide teaching via preferred learning method  Evaluate teaching effectiveness    _M___ Hemodynamic/Respiratory Status:       (Pre and Post Procedure Monitoring)  Assess/Monitor vital signs and LOC  Assess Baseline SpO2 prior to any sedation  Obtain weight/height  Assess vital signs/ LOC until patient meets discharge criteria  Monitor procedure site and notify MD of any issues

## 2023-10-17 NOTE — DISCHARGE INSTRUCTIONS
Prolia injection discharge instructions:    Side effects: headache, joint pain, rash, swelling    Next Prolia injection on: APRIL 30 1130    This medication is given every 6 months. We will need a new order before we schedule your next one due around:     Please call [phone] with a any questions or concerns.

## 2023-12-02 SDOH — HEALTH STABILITY: PHYSICAL HEALTH: ON AVERAGE, HOW MANY MINUTES DO YOU ENGAGE IN EXERCISE AT THIS LEVEL?: 30 MIN

## 2023-12-02 SDOH — HEALTH STABILITY: PHYSICAL HEALTH: ON AVERAGE, HOW MANY DAYS PER WEEK DO YOU ENGAGE IN MODERATE TO STRENUOUS EXERCISE (LIKE A BRISK WALK)?: 7 DAYS

## 2023-12-02 ASSESSMENT — LIFESTYLE VARIABLES
HOW OFTEN DURING THE LAST YEAR HAVE YOU BEEN UNABLE TO REMEMBER WHAT HAPPENED THE NIGHT BEFORE BECAUSE YOU HAD BEEN DRINKING: NEVER
HOW OFTEN DURING THE LAST YEAR HAVE YOU FAILED TO DO WHAT WAS NORMALLY EXPECTED FROM YOU BECAUSE OF DRINKING: 0
HOW MANY STANDARD DRINKS CONTAINING ALCOHOL DO YOU HAVE ON A TYPICAL DAY: 1
HOW OFTEN DURING THE LAST YEAR HAVE YOU BEEN UNABLE TO REMEMBER WHAT HAPPENED THE NIGHT BEFORE BECAUSE YOU HAD BEEN DRINKING: 0
HOW OFTEN DURING THE LAST YEAR HAVE YOU NEEDED AN ALCOHOLIC DRINK FIRST THING IN THE MORNING TO GET YOURSELF GOING AFTER A NIGHT OF HEAVY DRINKING: NEVER
HOW OFTEN DURING THE LAST YEAR HAVE YOU HAD A FEELING OF GUILT OR REMORSE AFTER DRINKING: 0
HAVE YOU OR SOMEONE ELSE BEEN INJURED AS A RESULT OF YOUR DRINKING: NO
HOW OFTEN DURING THE LAST YEAR HAVE YOU NEEDED AN ALCOHOLIC DRINK FIRST THING IN THE MORNING TO GET YOURSELF GOING AFTER A NIGHT OF HEAVY DRINKING: 0
HOW OFTEN DO YOU HAVE A DRINK CONTAINING ALCOHOL: 4 OR MORE TIMES A WEEK
HOW OFTEN DURING THE LAST YEAR HAVE YOU HAD A FEELING OF GUILT OR REMORSE AFTER DRINKING: NEVER
HOW OFTEN DURING THE LAST YEAR HAVE YOU FOUND THAT YOU WERE NOT ABLE TO STOP DRINKING ONCE YOU HAD STARTED: NEVER
HAVE YOU OR SOMEONE ELSE BEEN INJURED AS A RESULT OF YOUR DRINKING: 0
HOW OFTEN DURING THE LAST YEAR HAVE YOU FOUND THAT YOU WERE NOT ABLE TO STOP DRINKING ONCE YOU HAD STARTED: 0
HAS A RELATIVE, FRIEND, DOCTOR, OR ANOTHER HEALTH PROFESSIONAL EXPRESSED CONCERN ABOUT YOUR DRINKING OR SUGGESTED YOU CUT DOWN: NO
HOW OFTEN DO YOU HAVE A DRINK CONTAINING ALCOHOL: 5
HOW OFTEN DO YOU HAVE SIX OR MORE DRINKS ON ONE OCCASION: 1
HOW MANY STANDARD DRINKS CONTAINING ALCOHOL DO YOU HAVE ON A TYPICAL DAY: 1 OR 2
HOW OFTEN DURING THE LAST YEAR HAVE YOU FAILED TO DO WHAT WAS NORMALLY EXPECTED FROM YOU BECAUSE OF DRINKING: NEVER
HAS A RELATIVE, FRIEND, DOCTOR, OR ANOTHER HEALTH PROFESSIONAL EXPRESSED CONCERN ABOUT YOUR DRINKING OR SUGGESTED YOU CUT DOWN: 0

## 2023-12-02 ASSESSMENT — PATIENT HEALTH QUESTIONNAIRE - PHQ9
1. LITTLE INTEREST OR PLEASURE IN DOING THINGS: 0
SUM OF ALL RESPONSES TO PHQ QUESTIONS 1-9: 0
SUM OF ALL RESPONSES TO PHQ QUESTIONS 1-9: 0
SUM OF ALL RESPONSES TO PHQ9 QUESTIONS 1 & 2: 0
2. FEELING DOWN, DEPRESSED OR HOPELESS: 0
SUM OF ALL RESPONSES TO PHQ QUESTIONS 1-9: 0
SUM OF ALL RESPONSES TO PHQ QUESTIONS 1-9: 0

## 2023-12-04 ENCOUNTER — OFFICE VISIT (OUTPATIENT)
Dept: INTERNAL MEDICINE CLINIC | Age: 73
End: 2023-12-04

## 2023-12-04 VITALS
HEIGHT: 66 IN | BODY MASS INDEX: 17.74 KG/M2 | SYSTOLIC BLOOD PRESSURE: 130 MMHG | HEART RATE: 72 BPM | WEIGHT: 110.4 LBS | OXYGEN SATURATION: 99 % | DIASTOLIC BLOOD PRESSURE: 80 MMHG | TEMPERATURE: 98.2 F

## 2023-12-04 DIAGNOSIS — Z00.00 MEDICARE ANNUAL WELLNESS VISIT, SUBSEQUENT: Primary | ICD-10-CM

## 2023-12-04 NOTE — PROGRESS NOTES
Medicare Annual Wellness Visit    Willy Sanford is here for Medicare AWV    Assessment & Plan   Medicare annual wellness visit, subsequent  Recommendations for Preventive Services Due: see orders and patient instructions/AVS.  Recommended screening schedule for the next 5-10 years is provided to the patient in written form: see Patient Instructions/AVS.    Suggest eating 3 meals a day and balanced meals. Consider getting flu/COVID vaccine - but with reactions in the past - may want to avoid. Will be changing mail away pharmacy in January. Keep regular visit in 6/2024 and make a yearly wellness. She has an advantage plan, but didn't do one with her insurance plan. 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  Then suggested Atarax 25 mg nightly but nervous as already taking Trazodone. As not itching at night and already on Allegra during the day - may not get much benefit from Atarax. If want to try it - take 1/2 dose Trazodone with Atarax. Patient's complete Health Risk Assessment and screening values have been reviewed and are found in Flowsheets. The following problems were reviewed today and where indicated follow up appointments were made and/or referrals ordered. Positive Risk Factor Screenings with Interventions:       Cognitive: Words recalled: 3 Words Recalled   Clock Drawing Test (CDT): (!) Abnormal   Total Score: 3   Total Score Interpretation: Normal Mini-Cog      Interventions:  She uses a digital clock and may not have been paying attention when instructions given.              Weight and Activity:  Physical Activity: Sufficiently Active (12/2/2023)    Exercise Vital Sign     Days of Exercise per Week: 7 days     Minutes of Exercise per Session: 30 min     On average, how many days per week do you engage in moderate to strenuous exercise (like a brisk walk)?: 7 days  Have you lost any weight without

## 2023-12-04 NOTE — PATIENT INSTRUCTIONS
Suggest eating 3 meals a day and balanced meals. Consider getting flu/COVID vaccine - but with reactions in the past - may want to avoid. Learning About Mild Cognitive Impairment (MCI)  What is mild cognitive impairment (MCI)? It's common to forget things sometimes as we get older. But some older people have memory loss that's more than normal aging. It's called mild cognitive impairment, or MCI. It is not the same as dementia. People with the condition often know that their memory or mental function has changed. Tests may show some loss. But their minds work well overall. They can carry out daily tasks that are normal for them. People with MCI have a higher chance of one day getting dementia. But not all people who have it will get dementia. Some people may stay the same over time. What are the symptoms? People with MCI have more memory loss than what occurs with normal aging. They may have increasing trouble with recalling words and keeping up with conversations. They may also have trouble remembering important events and making decisions. What puts you at risk? The risk of getting MCI increases with age. Having high blood pressure or having a family history of MCI may also increase your risk. How is it diagnosed? Your doctor will do a physical exam.  You may be asked questions to check your memory and other mental skills. Your doctor may also talk to close friends and family members. This can help the doctor figure out how your memory and other mental skills have changed. You may get blood tests and tests that look at your brain. These questions and tests can make sure you don't have other conditions that can cause symptoms like MCI. These include depression, sleep problems, and side effects from medicines. How is it treated? There are no medicines to treat MCI or to keep it from progressing to dementia. But treating conditions like high blood pressure and diabetes may help.  A person

## 2024-02-19 ASSESSMENT — PATIENT HEALTH QUESTIONNAIRE - PHQ9
SUM OF ALL RESPONSES TO PHQ QUESTIONS 1-9: 0
SUM OF ALL RESPONSES TO PHQ QUESTIONS 1-9: 0
2. FEELING DOWN, DEPRESSED OR HOPELESS: NOT AT ALL
2. FEELING DOWN, DEPRESSED OR HOPELESS: 0
1. LITTLE INTEREST OR PLEASURE IN DOING THINGS: 0
1. LITTLE INTEREST OR PLEASURE IN DOING THINGS: NOT AT ALL
SUM OF ALL RESPONSES TO PHQ9 QUESTIONS 1 & 2: 0
SUM OF ALL RESPONSES TO PHQ QUESTIONS 1-9: 0
SUM OF ALL RESPONSES TO PHQ9 QUESTIONS 1 & 2: 0
SUM OF ALL RESPONSES TO PHQ QUESTIONS 1-9: 0

## 2024-02-20 ENCOUNTER — OFFICE VISIT (OUTPATIENT)
Dept: INTERNAL MEDICINE CLINIC | Age: 74
End: 2024-02-20

## 2024-02-20 VITALS
HEART RATE: 64 BPM | RESPIRATION RATE: 18 BRPM | SYSTOLIC BLOOD PRESSURE: 126 MMHG | OXYGEN SATURATION: 100 % | WEIGHT: 110.6 LBS | BODY MASS INDEX: 17.86 KG/M2 | DIASTOLIC BLOOD PRESSURE: 68 MMHG | TEMPERATURE: 97.8 F

## 2024-02-20 DIAGNOSIS — M81.0 OSTEOPOROSIS WITHOUT CURRENT PATHOLOGICAL FRACTURE, UNSPECIFIED OSTEOPOROSIS TYPE: ICD-10-CM

## 2024-02-20 DIAGNOSIS — I10 ESSENTIAL HYPERTENSION: ICD-10-CM

## 2024-02-20 DIAGNOSIS — J01.00 ACUTE NON-RECURRENT MAXILLARY SINUSITIS: Primary | ICD-10-CM

## 2024-02-20 DIAGNOSIS — G47.00 INSOMNIA, UNSPECIFIED TYPE: ICD-10-CM

## 2024-02-20 RX ORDER — TRAZODONE HYDROCHLORIDE 50 MG/1
TABLET ORAL
Qty: 90 TABLET | Refills: 2 | Status: SHIPPED | OUTPATIENT
Start: 2024-02-20

## 2024-02-20 RX ORDER — DOXYCYCLINE HYCLATE 100 MG
100 TABLET ORAL 2 TIMES DAILY
Qty: 14 TABLET | Refills: 0 | Status: SHIPPED | OUTPATIENT
Start: 2024-02-20 | End: 2024-02-27

## 2024-02-20 RX ORDER — LISINOPRIL 5 MG/1
TABLET ORAL
Qty: 90 TABLET | Refills: 2 | Status: SHIPPED | OUTPATIENT
Start: 2024-02-20

## 2024-02-20 NOTE — PROGRESS NOTES
bloody stools  Genito-Urinary ROS: no dysuria, trouble voiding, or hematuria  Musculoskeletal ROS: negative for - muscle pain or muscular weakness, positive right buttock/leg pain - intermittent but controlled with short course NSAID and exercises.  Neurological ROS: negative for - headaches, numbness/tingling, seizures or weakness  Dermatological ROS: negative for - rash or skin lesion changes     Blood pressure 126/68, pulse 64, temperature 97.8 °F (36.6 °C), resp. rate 18, weight 50.2 kg (110 lb 9.6 oz), SpO2 100 %.    Physical Examination: General appearance -alert, well appearing, and in no distress  Neck - supple, no significant adenopathy  Chest - clear to auscultation, no wheezes, rales or rhonchi, symmetric air entry  Heart - normal rate, regular rhythm, normal S1, S2, no murmurs, rubs, clicks or gallops  Abdomen -soft, nontender, nondistended  Neurological - alert, oriented, normal speech  Extremities - peripheral pulses normal, no pedal edema, no clubbing or cyanosis  Skin - warm and dry    Diagnostic data: None.    Assessment and Plan:      Diagnosis Orders   1. Acute non-recurrent maxillary sinusitis  doxycycline hyclate (VIBRA-TABS) 100 MG tablet      2. Essential hypertension  lisinopril (PRINIVIL;ZESTRIL) 5 MG tablet    Basic Metabolic Panel      3. Insomnia, unspecified type  traZODone (DESYREL) 50 MG tablet      4. Osteoporosis without current pathological fracture, unspecified osteoporosis type  Vitamin D 25 Hydroxy        Acute sinusitis/Allergic rhinitis -treat with doxycycline.  Will continue Allegra and Nasocort.    Hypertension - BP controlled, on lisinopril - will continue. BMP due 6/2024.    Insomnia - controlled on Trazodone - will continue.    Osteoporosis - on Prolia with GYN, will follow BMP and vitamin D level.  DEXA 7/2023.    Follow up 6/2023 for regular visit and 12/2024 with wellness.  Labs due 6/2024.  [facility] [place] - sent script there.    Radha Kraft,

## 2024-02-20 NOTE — PATIENT INSTRUCTIONS
Start polysporin on q-tip to both nares twice a day.  Resume Nasocort when nose healed  Start Doxycycline 100 mg BID x 7 days - call if not improving.    Start vitamin D 2000 IU daily OTC once finish Dr. Valle vitamin.

## 2024-02-28 ENCOUNTER — TELEPHONE (OUTPATIENT)
Dept: INTERNAL MEDICINE CLINIC | Age: 74
End: 2024-02-28

## 2024-02-28 DIAGNOSIS — J01.00 ACUTE NON-RECURRENT MAXILLARY SINUSITIS: ICD-10-CM

## 2024-02-28 RX ORDER — DOXYCYCLINE HYCLATE 100 MG
100 TABLET ORAL 2 TIMES DAILY
Qty: 14 TABLET | Refills: 0 | Status: SHIPPED | OUTPATIENT
Start: 2024-02-28 | End: 2024-03-06

## 2024-02-29 NOTE — TELEPHONE ENCOUNTER
Patient called stating that she finished the 7 days of Doxycyline but she is still having having right sinus pain , pressure and congestion . Patient was asking if she should have another coarse of antibiotic .   
Patient informed.   
We had discussed earlier today but didn't see order in chart - sent script for Doxy x7 days to [facility] tonight.  
ambulatory

## 2024-04-30 ENCOUNTER — HOSPITAL ENCOUNTER (OUTPATIENT)
Dept: NURSING | Age: 74
Discharge: HOME | End: 2024-04-30
Payer: MEDICARE

## 2024-04-30 VITALS
HEART RATE: 66 BPM | RESPIRATION RATE: 18 BRPM | OXYGEN SATURATION: 98 % | SYSTOLIC BLOOD PRESSURE: 139 MMHG | DIASTOLIC BLOOD PRESSURE: 65 MMHG

## 2024-04-30 DIAGNOSIS — M81.0 AGE-RELATED OSTEOPOROSIS WITHOUT CURRENT PATHOLOGICAL FRACTURE: Primary | ICD-10-CM

## 2024-04-30 PROCEDURE — 6360000002 HC RX W HCPCS: Performed by: NURSE PRACTITIONER

## 2024-04-30 PROCEDURE — 96372 THER/PROPH/DIAG INJ SC/IM: CPT

## 2024-04-30 RX ORDER — SODIUM CHLORIDE 9 MG/ML
INJECTION, SOLUTION INTRAVENOUS CONTINUOUS
Status: CANCELLED | OUTPATIENT
Start: 2024-10-15

## 2024-04-30 RX ORDER — DIPHENHYDRAMINE HYDROCHLORIDE 50 MG/ML
50 INJECTION INTRAMUSCULAR; INTRAVENOUS
Status: CANCELLED | OUTPATIENT
Start: 2024-10-15

## 2024-04-30 RX ORDER — EPINEPHRINE 1 MG/ML
0.3 INJECTION, SOLUTION INTRAMUSCULAR; SUBCUTANEOUS PRN
Status: CANCELLED | OUTPATIENT
Start: 2024-10-15

## 2024-04-30 RX ADMIN — DENOSUMAB 60 MG: 60 INJECTION SUBCUTANEOUS at 11:42

## 2024-04-30 NOTE — DISCHARGE INSTRUCTIONS
ACTIVITY:  Continue usual care with your doctor. Call your doctor immediately if any severe problems or go to the nearest emergency room.    WILL NEED A NEW ORDER BEFORE NEXT APPT FOR PROLIA  I have been treated and hereby acknowledge receiving this instruction sheet.    FRANCINE [facility] [phone]

## 2024-04-30 NOTE — PROGRESS NOTES
1135:  ARRIVES AMBULATORY FOR PROLIA INJECTION.  PT STATES SHE TOLERATED LAST INJECTION WITHOUT ISSUES.  PLAN OF CARE AND NEED FOR NEW ORDER FOR NEXT INJECTION DISCUSSED.  1146:  TOLERATED INJECTION WELL AND PT DISCHARGED AMBULATORY WITH INSTRUCTIONS.    __M__ Safety:       (Environmental)  [place] to environment  Ensure ID band is correct and in place/ allergy band as needed  Assess for fall risk  Initiate fall precautions as applicable (fall band, side rails, etc.)  Call light within reach  Bed in low position/ wheels locked    _M___ Pain:       Assess pain level and characteristics  Administer analgesics as ordered  Assess effectiveness of pain management and report to MD as needed    _M___ Knowledge Deficit:  Assess baseline knowledge  Provide teaching at level of understanding  Provide teaching via preferred learning method  Evaluate teaching effectiveness    _ ___ Hemodynamic/Respiratory Status:       (Pre and Post Procedure Monitoring)  Assess/Monitor vital signs and LOC  Assess Baseline SpO2 prior to any sedation  Obtain weight/height  Assess vital signs/ LOC until patient meets discharge criteria  Monitor procedure site and notify MD of any issues    _

## 2024-05-30 ENCOUNTER — NURSE ONLY (OUTPATIENT)
Dept: LAB | Age: 74
End: 2024-05-30

## 2024-05-30 DIAGNOSIS — M81.0 OSTEOPOROSIS WITHOUT CURRENT PATHOLOGICAL FRACTURE, UNSPECIFIED OSTEOPOROSIS TYPE: ICD-10-CM

## 2024-05-30 DIAGNOSIS — I10 ESSENTIAL HYPERTENSION: ICD-10-CM

## 2024-05-30 LAB
25(OH)D3 SERPL-MCNC: 53 NG/ML (ref 30–100)
ANION GAP SERPL CALC-SCNC: 11 MEQ/L (ref 8–16)
BUN SERPL-MCNC: 13 MG/DL (ref 7–22)
CALCIUM SERPL-MCNC: 9.9 MG/DL (ref 8.5–10.5)
CHLORIDE SERPL-SCNC: 98 MEQ/L (ref 98–111)
CO2 SERPL-SCNC: 27 MEQ/L (ref 23–33)
CREAT SERPL-MCNC: 0.7 MG/DL (ref 0.4–1.2)
GFR SERPL CREATININE-BSD FRML MDRD: > 90 ML/MIN/1.73M2
GLUCOSE SERPL-MCNC: 104 MG/DL (ref 70–108)
POTASSIUM SERPL-SCNC: 4.5 MEQ/L (ref 3.5–5.2)
SODIUM SERPL-SCNC: 136 MEQ/L (ref 135–145)

## 2024-06-03 ENCOUNTER — OFFICE VISIT (OUTPATIENT)
Dept: INTERNAL MEDICINE CLINIC | Age: 74
End: 2024-06-03

## 2024-06-03 VITALS
SYSTOLIC BLOOD PRESSURE: 118 MMHG | WEIGHT: 109.8 LBS | TEMPERATURE: 98 F | DIASTOLIC BLOOD PRESSURE: 66 MMHG | HEART RATE: 84 BPM | BODY MASS INDEX: 17.64 KG/M2 | HEIGHT: 66 IN

## 2024-06-03 DIAGNOSIS — G47.00 INSOMNIA, UNSPECIFIED TYPE: ICD-10-CM

## 2024-06-03 DIAGNOSIS — J30.9 ALLERGIC RHINITIS, UNSPECIFIED SEASONALITY, UNSPECIFIED TRIGGER: ICD-10-CM

## 2024-06-03 DIAGNOSIS — I10 PRIMARY HYPERTENSION: Primary | ICD-10-CM

## 2024-06-03 DIAGNOSIS — M81.0 OSTEOPOROSIS WITHOUT CURRENT PATHOLOGICAL FRACTURE, UNSPECIFIED OSTEOPOROSIS TYPE: ICD-10-CM

## 2024-06-03 NOTE — PROGRESS NOTES
1950    Chief Complaint   Patient presents with    Hypertension     1 year / labs completed    Insomnia    Osteoporosis       Pt is a 74 y.o. female who presents for 4 month follow up.    Hypervitamin D - iatrogenic - She rolled ankle - 5th MT fx 12/6/21 - seeing Dr. Valle and healing on it's own.  No surgery.  She is on zulma dose of vitamin D (special supplement).  He last vitamin D was 70 6/2021 - due for level now after being on supplement 6 months - will need to make sure not getting to high - to get labs this week.  Level was >120 6/2022 -> 105 8/2022 -> 58 11/2022.  She decreased from zulma dose intermittent dosing a month ago to standard calcium with vitamin D OTC.  Level normal 5/2024.    SI joint pain intermittently - she had x-ray right hip 2016 with mild degenerative changes of right SI joint and given SI join steroid injection at [facility] radiology.  She had issues again 9/2018 and right hip x-ray with mild joint space narrowing and sclerosis at right SI joint/lumbar spine x-ray noted mild facet disease L3-S1 and mild levoconvex curvature of lumbar spine.  Again was sent back to [facility], Dr. Hart, for SI joint injection.  This January 2021, she started to have right hip - now pain down leg and foot tingles - but intermittent.  It is better if standing, worse with sitting and lying down.  If takes enough Aleve it will go away but comes back again.  She does do yoga.  Set up PT and Aleve x 6 weeks - see back in 8 weeks and will see if need to progress to MRI.  Sleeps on left side only as unable to sleep on right side due to pain.  She completed PT, states pain is mild and tolerable today.  She is trying to keep doing exercises.  Takes Aleve for short course with flairs.  Doing well now on same plan.    Hypertension - BP controlled on lisinopril.  No issue with cough, follow potassium and kidney function.  BMP 5/2024 normal.    Allergic rhinitis - at previous visit suggested she try Xyzal instead of

## 2024-06-09 RX ORDER — TRAZODONE HYDROCHLORIDE 50 MG/1
TABLET ORAL
Qty: 90 TABLET | Refills: 2 | Status: SHIPPED | OUTPATIENT
Start: 2024-06-09

## 2024-06-09 RX ORDER — LISINOPRIL 5 MG/1
TABLET ORAL
Qty: 90 TABLET | Refills: 2 | Status: SHIPPED | OUTPATIENT
Start: 2024-06-09

## 2024-12-18 ASSESSMENT — RHEUMATOLOGY NEW PATIENT QUESTIONNAIRE
DRYNESS OF MOUTH: N
MEMORY LOSS: N
UNUSUALLY RAPID OR SLOWED HEART RATE: N
ANEMIA: N
RASH: N
SHORTNESS OF BREATH: N
UNUSUAL BLEEDING: N
HEARTBURN OR REFLUX: N
BLOOD IN STOOLS: N
SKIN TIGHTNESS: N
EYE REDNESS: N
CHEST PAIN: N
DIFFICULTY BREATHING LYING DOWN: N
DOUBLE OR BLURRED VISION: N
UNEXPLAINED WEIGHT CHANGE: N
NIGHT SWEATS: N
JOINT SWELLING: N
EASILY LOSING TEMPER: N
NUMBNESS OR TINGLING IN HANDS OR FEET: N
UNEXPLAINED HEARING LOSS: N
UNUSUAL FATIGUE: N
PAIN OR BURNING ON URINATION: N
AGITATION: N
SWOLLEN LEGS OR FEET: N
NODULES/BUMPS: N
EASY BRUISING: N
VOMITING OF BLOOD OR COFFEE GROUND CONSISTENCY MATERIAL: N
VAGINAL DRYNESS: Y
DIFFICULTY STAYING ASLEEP: Y
COUGH: N
ABNORMAL URINE: N
ANXIETY: N
JAUNDICE: N
SWOLLEN OR TENDER GLANDS: N
BEHAVIORAL CHANGES: N
PERSISTENT DIARRHEA: N
MORNING STIFFNESS: N
SORES IN MOUTH OR NOSE: N
INCREASED SUSCEPTIBILITY TO INFECTION: N
SUN SENSITIVE (SUN ALLERGY): N
FAINTING: N
EYE PAIN: N
BLACK STOOLS: N
STOMACH PAIN: N
JOINT PAIN: N
SKIN REDNESS: N
FEVER: N
LOSS OF CONSCIOUSNESS: N
LOSS OF VISION: N
DIFFICULTY FALLING ASLEEP: N
EYE DRYNESS: N
HOARSE VOICE: N
DIFFICULTY SWALLOWING: N
DEPRESSION: N
MUSCLE WEAKNESS: N
SEIZURES: N
RASH OR ULCERS: N
HEADACHES: N
NAUSEA: N

## 2024-12-19 ENCOUNTER — OFFICE VISIT (OUTPATIENT)
Age: 74
End: 2024-12-19
Payer: MEDICARE

## 2024-12-19 VITALS
OXYGEN SATURATION: 96 % | HEART RATE: 72 BPM | WEIGHT: 109.4 LBS | DIASTOLIC BLOOD PRESSURE: 70 MMHG | SYSTOLIC BLOOD PRESSURE: 140 MMHG | HEIGHT: 66 IN | BODY MASS INDEX: 17.58 KG/M2

## 2024-12-19 DIAGNOSIS — M81.0 AGE-RELATED OSTEOPOROSIS WITHOUT CURRENT PATHOLOGICAL FRACTURE: Primary | ICD-10-CM

## 2024-12-19 PROCEDURE — 3078F DIAST BP <80 MM HG: CPT | Performed by: INTERNAL MEDICINE

## 2024-12-19 PROCEDURE — 1123F ACP DISCUSS/DSCN MKR DOCD: CPT | Performed by: INTERNAL MEDICINE

## 2024-12-19 PROCEDURE — 1159F MED LIST DOCD IN RCRD: CPT | Performed by: INTERNAL MEDICINE

## 2024-12-19 PROCEDURE — 99204 OFFICE O/P NEW MOD 45 MIN: CPT | Performed by: INTERNAL MEDICINE

## 2024-12-19 PROCEDURE — 3077F SYST BP >= 140 MM HG: CPT | Performed by: INTERNAL MEDICINE

## 2024-12-19 PROCEDURE — 1126F AMNT PAIN NOTED NONE PRSNT: CPT | Performed by: INTERNAL MEDICINE

## 2024-12-19 NOTE — PROGRESS NOTES
[facility]   Rheumatology Clinic Note      12/19/2024       Reason for Consult:  osteoporosis  Requesting Physician:  Lynn Vega PA-C     CHIEF COMPLAINT:    Chief Complaint   Patient presents with    New Patient     Osteoporosis      Other     Pt was Rx Prolia, last injection April 2024. Pt was having some dental issues (root canal). Trouble with teeth healing. Pt would like to discuss if Prolia could be causing or is there another underlying condition.            HISTORY OF PRESENT ILLNESS:    74 y.o. female presents today for evaluation of osteoporosis.  Has been on Prolia for 8 years.  Was on alendronate prior to that.     She rolled ankle - 5th MT fx 12/6/21     Had root canal that did not heal well. Then had a bridge. The teeth that the bridge was ancored on disintergrated. Was informed by her dentist that her teeth is crumbling. No gum disease. No jaw bone disease. The concern is that this could be a side effect from Prolia.       Past Medical History:     has a past medical history of Allergic rhinitis, Colon polyp, Hemorrhoids, Hypertension, Irritable bowel syndrome, Osteoporosis, and SI (sacroiliac) joint inflammation (HCC).    Past Surgical History:     has a past surgical history that includes Tonsillectomy and adenoidectomy and Ovary removal (2005).    Current Medications:      Current Outpatient Medications:     lisinopril (PRINIVIL;ZESTRIL) 5 MG tablet, TAKE 1 TABLET DAILY, Disp: 90 tablet, Rfl: 2    traZODone (DESYREL) 50 MG tablet, TAKE 1 TABLET NIGHTLY, Disp: 90 tablet, Rfl: 2    polyethylene glycol (MIRALAX) 17 g PACK packet, Take 17 g by mouth daily, Disp: , Rfl:     CALCIUM CITRATE PO, Take 1,200 mg by mouth daily, Disp: , Rfl:     fexofenadine (ALLEGRA) 180 MG tablet, Take 1 tablet by mouth daily, Disp: , Rfl:     Triamcinolone Acetonide (NASACORT ALLERGY 24HR NA), by Nasal route, Disp: , Rfl:     NONFORMULARY, Compounded hormones 6x a week., Disp: , Rfl:     miconazole

## 2024-12-23 ASSESSMENT — ENCOUNTER SYMPTOMS
SHORTNESS OF BREATH: 0
COUGH: 0
ABDOMINAL PAIN: 0
VOMITING: 0
NAUSEA: 0

## 2025-01-16 ENCOUNTER — TELEPHONE (OUTPATIENT)
Dept: INTERNAL MEDICINE CLINIC | Age: 75
End: 2025-01-16

## 2025-01-16 NOTE — TELEPHONE ENCOUNTER
Patient called stating that her 2/9 appointment was rescheduled to 4/10 but she needs your input on getting a Reclast  infusion . Dr. Carlson wants her to give it a try for her osteoporosis . Patient states that she is concerned because of all her dental issues . She is having issue with healing from a root canal and her dentist said she has never seen a tooth disappear from the inside out like her's . Patient's dentist will not do a dental implant since she has a histiry of being on Fosamax for more then 5 years . Patient has been off  Prolia since last April and not sure that she should proceed with the Reclast . Patient would like your opinion.

## 2025-01-20 ENCOUNTER — OFFICE VISIT (OUTPATIENT)
Dept: INTERNAL MEDICINE CLINIC | Age: 75
End: 2025-01-20

## 2025-01-20 VITALS
WEIGHT: 109.8 LBS | HEART RATE: 76 BPM | SYSTOLIC BLOOD PRESSURE: 138 MMHG | HEIGHT: 66 IN | BODY MASS INDEX: 17.64 KG/M2 | DIASTOLIC BLOOD PRESSURE: 72 MMHG | TEMPERATURE: 97.9 F

## 2025-01-20 DIAGNOSIS — I10 PRIMARY HYPERTENSION: ICD-10-CM

## 2025-01-20 DIAGNOSIS — G47.00 INSOMNIA, UNSPECIFIED TYPE: ICD-10-CM

## 2025-01-20 DIAGNOSIS — M81.0 OSTEOPOROSIS WITHOUT CURRENT PATHOLOGICAL FRACTURE, UNSPECIFIED OSTEOPOROSIS TYPE: Primary | ICD-10-CM

## 2025-01-20 RX ORDER — TRAZODONE HYDROCHLORIDE 50 MG/1
TABLET, FILM COATED ORAL
Qty: 90 TABLET | Refills: 2 | Status: SHIPPED | OUTPATIENT
Start: 2025-01-20

## 2025-01-20 RX ORDER — LISINOPRIL 5 MG/1
TABLET ORAL
Qty: 90 TABLET | Refills: 2 | Status: SHIPPED | OUTPATIENT
Start: 2025-01-20

## 2025-01-20 SDOH — ECONOMIC STABILITY: FOOD INSECURITY: WITHIN THE PAST 12 MONTHS, YOU WORRIED THAT YOUR FOOD WOULD RUN OUT BEFORE YOU GOT MONEY TO BUY MORE.: NEVER TRUE

## 2025-01-20 SDOH — ECONOMIC STABILITY: FOOD INSECURITY: WITHIN THE PAST 12 MONTHS, THE FOOD YOU BOUGHT JUST DIDN'T LAST AND YOU DIDN'T HAVE MONEY TO GET MORE.: NEVER TRUE

## 2025-01-20 SDOH — ECONOMIC STABILITY: INCOME INSECURITY: IN THE LAST 12 MONTHS, WAS THERE A TIME WHEN YOU WERE NOT ABLE TO PAY THE MORTGAGE OR RENT ON TIME?: NO

## 2025-01-20 ASSESSMENT — PATIENT HEALTH QUESTIONNAIRE - PHQ9
SUM OF ALL RESPONSES TO PHQ QUESTIONS 1-9: 0
1. LITTLE INTEREST OR PLEASURE IN DOING THINGS: NOT AT ALL
SUM OF ALL RESPONSES TO PHQ9 QUESTIONS 1 & 2: 0
2. FEELING DOWN, DEPRESSED OR HOPELESS: NOT AT ALL
1. LITTLE INTEREST OR PLEASURE IN DOING THINGS: NOT AT ALL
SUM OF ALL RESPONSES TO PHQ QUESTIONS 1-9: 0
SUM OF ALL RESPONSES TO PHQ9 QUESTIONS 1 & 2: 0
SUM OF ALL RESPONSES TO PHQ QUESTIONS 1-9: 0
SUM OF ALL RESPONSES TO PHQ QUESTIONS 1-9: 0
2. FEELING DOWN, DEPRESSED OR HOPELESS: NOT AT ALL

## 2025-01-20 NOTE — PROGRESS NOTES
1950    Chief Complaint   Patient presents with    Advise on Reclast        Pt is a 74 y.o. female who presents acute visit - wants to discuss risk and benefits of drugs to treat osteoporosis.    Osteoporosis - treated by GYN - Lynn Carson CNP.  She has had >5 years on Fosamax and then on Prolia x 8 years.  BMP and vitamin D normal 5/2024.  She reports improvement on last DEXA.    But now issues with jaw bone not healing after tooth pulled and tooth disintegrating.  It started with a cracked filling, she did a root canal and temporary cap.  Tooth was still painful, so pulled tooth.  Oral surgeon did x-ray and refused to do implant as bone where tooth was pulled from is not healing.  Dentist decided to do a bridge.  Tooth that is holding bridge is disintegrating.  This tooth had to be pulled.  She saw Nadja and she referred to bone fragility clinic.  They recommended Reclast or nothing and wait till can get Dexa 7/2025 (she will have been off Prolia 9 mo then, last dose Prolia 4/30/24).  She is leaning to no med and check DEXA to make further adjustments from there.  Maybe could consider Evista, miacalcin nasal spray as other options.    We discussed risk/benefits - preservation of teeth with no osteoporosis drugs (if dental issues are related to drugs which we aren't sure of, but suspect strongly) vs continued Prolia/Reclast and risk teeth health.  At this point she wants to preserve teeth and stay off medication.  But willing to re-assess for osteoporosis medication after DEXA.    Hypervitamin D - iatrogenic - She rolled ankle - 5th MT fx 12/6/21 - seeing Dr. Valle and healing on it's own.  No surgery.  She is on zulma dose of vitamin D (special supplement).  He last vitamin D was 70 6/2021 - due for level now after being on supplement 6 months - will need to make sure not getting to high - to get labs this week.  Level was >120 6/2022 -> 105 8/2022 -> 58 11/2022.  She decreased from zulma dose intermittent

## 2025-02-24 ENCOUNTER — OFFICE VISIT (OUTPATIENT)
Dept: INTERNAL MEDICINE CLINIC | Age: 75
End: 2025-02-24

## 2025-02-24 VITALS
HEIGHT: 66 IN | WEIGHT: 108 LBS | BODY MASS INDEX: 17.36 KG/M2 | TEMPERATURE: 97.7 F | SYSTOLIC BLOOD PRESSURE: 132 MMHG | HEART RATE: 76 BPM | DIASTOLIC BLOOD PRESSURE: 70 MMHG

## 2025-02-24 DIAGNOSIS — S39.012A STRAIN OF LUMBAR REGION, INITIAL ENCOUNTER: ICD-10-CM

## 2025-02-24 DIAGNOSIS — B02.9 HERPES ZOSTER WITHOUT COMPLICATION: ICD-10-CM

## 2025-02-24 DIAGNOSIS — S39.012A STRAIN OF LUMBAR REGION, INITIAL ENCOUNTER: Primary | ICD-10-CM

## 2025-02-24 RX ORDER — TIZANIDINE 2 MG/1
2 TABLET ORAL EVERY 8 HOURS PRN
Qty: 30 TABLET | Refills: 0 | OUTPATIENT
Start: 2025-02-24

## 2025-02-24 RX ORDER — VALACYCLOVIR HYDROCHLORIDE 1 G/1
1000 TABLET, FILM COATED ORAL 3 TIMES DAILY
Qty: 21 TABLET | Refills: 0 | Status: SHIPPED | OUTPATIENT
Start: 2025-02-24 | End: 2025-02-24 | Stop reason: CLARIF

## 2025-02-24 RX ORDER — TIZANIDINE 2 MG/1
2 TABLET ORAL EVERY 8 HOURS PRN
Qty: 30 TABLET | Refills: 0 | Status: SHIPPED | OUTPATIENT
Start: 2025-02-24 | End: 2025-02-24 | Stop reason: CLARIF

## 2025-02-24 RX ORDER — VALACYCLOVIR HYDROCHLORIDE 1 G/1
1000 TABLET, FILM COATED ORAL 3 TIMES DAILY
Qty: 21 TABLET | Refills: 0 | OUTPATIENT
Start: 2025-02-24 | End: 2025-03-03

## 2025-02-24 NOTE — PROGRESS NOTES
1950    Chief Complaint   Patient presents with    lower left side soreness and burning        Pt is a 74 y.o. female who presents for an urgent visit.      HPI    Left sided low back (paraspineal) soreness/burning x a couple weeks.  She tried Aleve BID x a week and didn't seem to help.  No vesicles but suspect shingles.    IBS with constipation - on Miralax daily and doing well.      Past Medical History:   Diagnosis Date    Allergic rhinitis     Claritin, Flonase prn    Colon polyp     Dr. Smith - colonoscopy done 5/2015, due in 5-7 years (hyperplastic polyps)    Hemorrhoids     s/p banding    Hypertension     Irritable bowel syndrome     Osteoporosis     >5 years on Fosamax and then on Prolia x 8 years (last dose Prolia 4/30/24)    SI (sacroiliac) joint inflammation     s/p SI joint injections - Dr. Stephens at [facility] due to insurance - 10/2018       Past Surgical History:   Procedure Laterality Date    OVARY REMOVAL  2005    TONSILLECTOMY AND ADENOIDECTOMY         Current Outpatient Medications   Medication Sig Dispense Refill    lisinopril (PRINIVIL;ZESTRIL) 5 MG tablet TAKE 1 TABLET DAILY 90 tablet 2    traZODone (DESYREL) 50 MG tablet TAKE 1 TABLET NIGHTLY 90 tablet 2    polyethylene glycol (MIRALAX) 17 g PACK packet Take 17 g by mouth daily      CALCIUM CITRATE PO Take 1,200 mg by mouth daily      miconazole nitrate 2 % OINT Apply topically 2 times daily 1 Tube 1    fexofenadine (ALLEGRA) 180 MG tablet Take 1 tablet by mouth daily      Triamcinolone Acetonide (NASACORT ALLERGY 24HR NA) by Nasal route      NONFORMULARY Compounded hormones 6x a week.      valACYclovir (VALTREX) 1 g tablet Take 1 tablet by mouth 3 times daily for 7 days 21 tablet 0    tiZANidine (ZANAFLEX) 2 MG tablet Take 1 tablet by mouth every 8 hours as needed (pain) 30 tablet 0     No current facility-administered medications for this visit.       Allergies   Allergen Reactions    Pcn [Penicillins] Other (See Comments)    Chocolate

## 2025-02-24 NOTE — PATIENT INSTRUCTIONS
Lidocaine patch on 12 hours and off 12 hours, get OTC.    Consider getting massage.  Tizanidine at bedtime for muscle spasm - don't drive on it.     Start start with blister or rash - start Valtrex.

## 2025-02-24 NOTE — TELEPHONE ENCOUNTER
Previous scripts canceled to mail pharmacy and scripts called to [facility] pharmacy , left prescription information on prescriber's voicemail .

## 2025-06-04 ENCOUNTER — LAB (OUTPATIENT)
Dept: LAB | Age: 75
End: 2025-06-04

## 2025-06-04 DIAGNOSIS — M81.0 OSTEOPOROSIS WITHOUT CURRENT PATHOLOGICAL FRACTURE, UNSPECIFIED OSTEOPOROSIS TYPE: ICD-10-CM

## 2025-06-04 DIAGNOSIS — I10 PRIMARY HYPERTENSION: ICD-10-CM

## 2025-06-04 LAB
25(OH)D3 SERPL-MCNC: 44 NG/ML (ref 30–100)
ANION GAP SERPL CALC-SCNC: 12 MEQ/L (ref 8–16)
BASOPHILS ABSOLUTE: 0.1 THOU/MM3 (ref 0–0.1)
BASOPHILS NFR BLD AUTO: 1.1 %
BUN SERPL-MCNC: 16 MG/DL (ref 8–23)
CALCIUM SERPL-MCNC: 9.9 MG/DL (ref 8.8–10.2)
CHLORIDE SERPL-SCNC: 98 MEQ/L (ref 98–111)
CO2 SERPL-SCNC: 27 MEQ/L (ref 22–29)
CREAT SERPL-MCNC: 0.9 MG/DL (ref 0.5–0.9)
DEPRECATED RDW RBC AUTO: 51.6 FL (ref 35–45)
EOSINOPHIL NFR BLD AUTO: 2.5 %
EOSINOPHILS ABSOLUTE: 0.1 THOU/MM3 (ref 0–0.4)
ERYTHROCYTE [DISTWIDTH] IN BLOOD BY AUTOMATED COUNT: 13.5 % (ref 11.5–14.5)
GFR SERPL CREATININE-BSD FRML MDRD: 67 ML/MIN/1.73M2
GLUCOSE SERPL-MCNC: 109 MG/DL (ref 74–109)
HCT VFR BLD AUTO: 36.3 % (ref 37–47)
HGB BLD-MCNC: 12 GM/DL (ref 12–16)
IMM GRANULOCYTES # BLD AUTO: 0.01 THOU/MM3 (ref 0–0.07)
IMM GRANULOCYTES NFR BLD AUTO: 0.2 %
LYMPHOCYTES ABSOLUTE: 1.3 THOU/MM3 (ref 1–4.8)
LYMPHOCYTES NFR BLD AUTO: 23.4 %
MCH RBC QN AUTO: 34 PG (ref 26–33)
MCHC RBC AUTO-ENTMCNC: 33.1 GM/DL (ref 32.2–35.5)
MCV RBC AUTO: 102.8 FL (ref 81–99)
MONOCYTES ABSOLUTE: 0.5 THOU/MM3 (ref 0.4–1.3)
MONOCYTES NFR BLD AUTO: 9.6 %
NEUTROPHILS ABSOLUTE: 3.5 THOU/MM3 (ref 1.8–7.7)
NEUTROPHILS NFR BLD AUTO: 63.2 %
NRBC BLD AUTO-RTO: 0 /100 WBC
PLATELET # BLD AUTO: 212 THOU/MM3 (ref 130–400)
PMV BLD AUTO: 10 FL (ref 9.4–12.4)
POTASSIUM SERPL-SCNC: 3.9 MEQ/L (ref 3.5–5.2)
RBC # BLD AUTO: 3.53 MILL/MM3 (ref 4.2–5.4)
SODIUM SERPL-SCNC: 137 MEQ/L (ref 135–145)
WBC # BLD AUTO: 5.5 THOU/MM3 (ref 4.8–10.8)

## 2025-06-06 SDOH — ECONOMIC STABILITY: FOOD INSECURITY: WITHIN THE PAST 12 MONTHS, THE FOOD YOU BOUGHT JUST DIDN'T LAST AND YOU DIDN'T HAVE MONEY TO GET MORE.: NEVER TRUE

## 2025-06-06 SDOH — ECONOMIC STABILITY: FOOD INSECURITY: WITHIN THE PAST 12 MONTHS, YOU WORRIED THAT YOUR FOOD WOULD RUN OUT BEFORE YOU GOT MONEY TO BUY MORE.: NEVER TRUE

## 2025-06-06 SDOH — ECONOMIC STABILITY: INCOME INSECURITY: IN THE LAST 12 MONTHS, WAS THERE A TIME WHEN YOU WERE NOT ABLE TO PAY THE MORTGAGE OR RENT ON TIME?: NO

## 2025-06-09 ENCOUNTER — OFFICE VISIT (OUTPATIENT)
Dept: INTERNAL MEDICINE CLINIC | Age: 75
End: 2025-06-09

## 2025-06-09 VITALS
SYSTOLIC BLOOD PRESSURE: 128 MMHG | HEIGHT: 66 IN | TEMPERATURE: 98.3 F | DIASTOLIC BLOOD PRESSURE: 72 MMHG | HEART RATE: 68 BPM | BODY MASS INDEX: 17.74 KG/M2 | WEIGHT: 110.4 LBS

## 2025-06-09 DIAGNOSIS — Z78.0 POSTMENOPAUSAL: ICD-10-CM

## 2025-06-09 DIAGNOSIS — J30.9 ALLERGIC RHINITIS, UNSPECIFIED SEASONALITY, UNSPECIFIED TRIGGER: ICD-10-CM

## 2025-06-09 DIAGNOSIS — M81.0 OSTEOPOROSIS WITHOUT CURRENT PATHOLOGICAL FRACTURE, UNSPECIFIED OSTEOPOROSIS TYPE: ICD-10-CM

## 2025-06-09 DIAGNOSIS — R71.8 ELEVATED MCV: ICD-10-CM

## 2025-06-09 DIAGNOSIS — I10 PRIMARY HYPERTENSION: Primary | ICD-10-CM

## 2025-06-09 DIAGNOSIS — G47.00 INSOMNIA, UNSPECIFIED TYPE: ICD-10-CM

## 2025-06-09 RX ORDER — LISINOPRIL 5 MG/1
TABLET ORAL
Qty: 90 TABLET | Refills: 1 | Status: SHIPPED | OUTPATIENT
Start: 2025-06-09

## 2025-06-09 RX ORDER — TRAZODONE HYDROCHLORIDE 50 MG/1
TABLET ORAL
Qty: 90 TABLET | Refills: 2 | Status: SHIPPED | OUTPATIENT
Start: 2025-06-09

## 2025-06-09 NOTE — PATIENT INSTRUCTIONS
Call Dr. Zamudio office - ask about your insurance and if she is covered - doesn't make sense that Dr. Yanez is covered and she isn't.

## 2025-06-09 NOTE — PROGRESS NOTES
1950    Chief Complaint   Patient presents with    Hypertension    Insomnia    Osteoporosis       Pt is a 75 y.o. female who presents follow up visit.  I saw her for chronic conditions a year ago, osteoporosis 1/2025, acute back pain 2/2025.    She recently had a viral illness - lasted 2-3 days. Resolved.  She still has the itching from rash which is typical for her.      Back pain - She thought back pain from previous visit was from wearing weights around waist with [facility].  Still doing exercise but not wearing weigths and back pain resolved.  The tizanidine worked for pain.    IBS with constipation - saw Marycruz - she had breath test and was negative for SIBO.  She has a colonoscopy with Dr. Smith soon - last Friday of 6/2025.     Osteoporosis - treated by GYN - Lynn Carson CNP.  She has had >5 years on Fosamax and then on Prolia x 8 years.  BMP and vitamin D normal 5/2024.  She reports improvement on last DEXA.    But now issues with jaw bone not healing after tooth pulled and tooth disintegrating.  It started with a cracked filling, she did a root canal and temporary cap.  Tooth was still painful, so pulled tooth.  Oral surgeon did x-ray and refused to do implant as bone where tooth was pulled from is not healing.  Dentist decided to do a bridge.  Tooth that is holding bridge is disintegrating.  This tooth had to be pulled.  She saw Nadja and she referred to bone fragility clinic.  They recommended Reclast or nothing and wait till can get Dexa 7/2025 (she will have been off Prolia 9 mo then, last dose Prolia 4/30/24).  She is leaning to no med and check DEXA to make further adjustments from there.  Maybe could consider Evista, miacalcin nasal spray as other options.    We discussed risk/benefits - preservation of teeth with no osteoporosis drugs (if dental issues are related to drugs which we aren't sure of, but suspect strongly) vs continued Prolia/Reclast and risk teeth health.  At

## 2025-07-01 ENCOUNTER — LAB (OUTPATIENT)
Dept: LAB | Age: 75
End: 2025-07-01

## 2025-07-01 DIAGNOSIS — R71.8 ELEVATED MCV: ICD-10-CM

## 2025-07-01 LAB
FOLATE SERPL-MCNC: 19.1 NG/ML (ref 4.6–34.8)
VIT B12 SERPL-MCNC: 353 PG/ML (ref 232–1245)

## 2025-07-06 ENCOUNTER — RESULTS FOLLOW-UP (OUTPATIENT)
Dept: INTERNAL MEDICINE CLINIC | Age: 75
End: 2025-07-06

## 2025-07-09 NOTE — TELEPHONE ENCOUNTER
----- Message from Dr. Radha Kraft MD sent at 7/6/2025  9:50 PM EDT -----  Please call patient and let them know results were acceptable.

## 2025-08-04 SDOH — HEALTH STABILITY: PHYSICAL HEALTH: ON AVERAGE, HOW MANY MINUTES DO YOU ENGAGE IN EXERCISE AT THIS LEVEL?: 30 MIN

## 2025-08-04 SDOH — HEALTH STABILITY: PHYSICAL HEALTH: ON AVERAGE, HOW MANY DAYS PER WEEK DO YOU ENGAGE IN MODERATE TO STRENUOUS EXERCISE (LIKE A BRISK WALK)?: 5 DAYS

## 2025-08-04 ASSESSMENT — LIFESTYLE VARIABLES
HOW OFTEN DO YOU HAVE SIX OR MORE DRINKS ON ONE OCCASION: 1
HOW MANY STANDARD DRINKS CONTAINING ALCOHOL DO YOU HAVE ON A TYPICAL DAY: 1
HOW MANY STANDARD DRINKS CONTAINING ALCOHOL DO YOU HAVE ON A TYPICAL DAY: 1 OR 2
HOW OFTEN DURING THE LAST YEAR HAVE YOU BEEN UNABLE TO REMEMBER WHAT HAPPENED THE NIGHT BEFORE BECAUSE YOU HAD BEEN DRINKING: NEVER
HOW OFTEN DURING THE LAST YEAR HAVE YOU HAD A FEELING OF GUILT OR REMORSE AFTER DRINKING: NEVER
HOW OFTEN DO YOU HAVE A DRINK CONTAINING ALCOHOL: 4 OR MORE TIMES A WEEK
HAVE YOU OR SOMEONE ELSE BEEN INJURED AS A RESULT OF YOUR DRINKING: NO
HOW OFTEN DURING THE LAST YEAR HAVE YOU NEEDED AN ALCOHOLIC DRINK FIRST THING IN THE MORNING TO GET YOURSELF GOING AFTER A NIGHT OF HEAVY DRINKING: NEVER
HOW OFTEN DURING THE LAST YEAR HAVE YOU FOUND THAT YOU WERE NOT ABLE TO STOP DRINKING ONCE YOU HAD STARTED: NEVER
HOW OFTEN DURING THE LAST YEAR HAVE YOU NEEDED AN ALCOHOLIC DRINK FIRST THING IN THE MORNING TO GET YOURSELF GOING AFTER A NIGHT OF HEAVY DRINKING: NEVER
HAVE YOU OR SOMEONE ELSE BEEN INJURED AS A RESULT OF YOUR DRINKING: NO
HAS A RELATIVE, FRIEND, DOCTOR, OR ANOTHER HEALTH PROFESSIONAL EXPRESSED CONCERN ABOUT YOUR DRINKING OR SUGGESTED YOU CUT DOWN: NO
HOW OFTEN DURING THE LAST YEAR HAVE YOU BEEN UNABLE TO REMEMBER WHAT HAPPENED THE NIGHT BEFORE BECAUSE YOU HAD BEEN DRINKING: NEVER
HAS A RELATIVE, FRIEND, DOCTOR, OR ANOTHER HEALTH PROFESSIONAL EXPRESSED CONCERN ABOUT YOUR DRINKING OR SUGGESTED YOU CUT DOWN: NO
HOW OFTEN DURING THE LAST YEAR HAVE YOU HAD A FEELING OF GUILT OR REMORSE AFTER DRINKING: NEVER
HOW OFTEN DURING THE LAST YEAR HAVE YOU FAILED TO DO WHAT WAS NORMALLY EXPECTED FROM YOU BECAUSE OF DRINKING: NEVER
HOW OFTEN DURING THE LAST YEAR HAVE YOU FAILED TO DO WHAT WAS NORMALLY EXPECTED FROM YOU BECAUSE OF DRINKING: NEVER
HOW OFTEN DURING THE LAST YEAR HAVE YOU FOUND THAT YOU WERE NOT ABLE TO STOP DRINKING ONCE YOU HAD STARTED: NEVER
HOW OFTEN DO YOU HAVE A DRINK CONTAINING ALCOHOL: 5

## 2025-08-04 ASSESSMENT — PATIENT HEALTH QUESTIONNAIRE - PHQ9
1. LITTLE INTEREST OR PLEASURE IN DOING THINGS: NOT AT ALL
SUM OF ALL RESPONSES TO PHQ QUESTIONS 1-9: 0
2. FEELING DOWN, DEPRESSED OR HOPELESS: NOT AT ALL
SUM OF ALL RESPONSES TO PHQ QUESTIONS 1-9: 0

## 2025-08-07 ENCOUNTER — OFFICE VISIT (OUTPATIENT)
Dept: INTERNAL MEDICINE CLINIC | Age: 75
End: 2025-08-07

## 2025-08-07 VITALS
HEIGHT: 66 IN | HEART RATE: 72 BPM | SYSTOLIC BLOOD PRESSURE: 130 MMHG | TEMPERATURE: 98.3 F | WEIGHT: 107.8 LBS | BODY MASS INDEX: 17.33 KG/M2 | DIASTOLIC BLOOD PRESSURE: 80 MMHG

## 2025-08-07 DIAGNOSIS — G89.29 CHRONIC LOW BACK PAIN WITH RIGHT-SIDED SCIATICA, UNSPECIFIED BACK PAIN LATERALITY: ICD-10-CM

## 2025-08-07 DIAGNOSIS — I10 PRIMARY HYPERTENSION: ICD-10-CM

## 2025-08-07 DIAGNOSIS — Z13.220 SCREENING CHOLESTEROL LEVEL: ICD-10-CM

## 2025-08-07 DIAGNOSIS — Z00.00 MEDICARE ANNUAL WELLNESS VISIT, SUBSEQUENT: Primary | ICD-10-CM

## 2025-08-07 DIAGNOSIS — M54.41 CHRONIC LOW BACK PAIN WITH RIGHT-SIDED SCIATICA, UNSPECIFIED BACK PAIN LATERALITY: ICD-10-CM
